# Patient Record
Sex: MALE | Race: WHITE | NOT HISPANIC OR LATINO | Employment: OTHER | URBAN - METROPOLITAN AREA
[De-identification: names, ages, dates, MRNs, and addresses within clinical notes are randomized per-mention and may not be internally consistent; named-entity substitution may affect disease eponyms.]

---

## 2017-02-28 ENCOUNTER — HOSPITAL ENCOUNTER (EMERGENCY)
Facility: HOSPITAL | Age: 52
Discharge: HOME | End: 2017-03-01
Attending: EMERGENCY MEDICINE | Admitting: EMERGENCY MEDICINE

## 2017-02-28 DIAGNOSIS — M54.50 ACUTE EXACERBATION OF CHRONIC LOW BACK PAIN: Primary | ICD-10-CM

## 2017-02-28 DIAGNOSIS — G89.29 ACUTE EXACERBATION OF CHRONIC LOW BACK PAIN: Primary | ICD-10-CM

## 2017-02-28 PROCEDURE — 99283 EMERGENCY DEPT VISIT LOW MDM: CPT

## 2017-02-28 RX ORDER — LIDOCAINE 50 MG/G
1 PATCH TOPICAL EVERY 24 HOURS
Qty: 6 PATCH | Refills: 0 | Status: SHIPPED | OUTPATIENT
Start: 2017-02-28 | End: 2017-04-17 | Stop reason: SDUPTHER

## 2017-02-28 RX ORDER — ARIPIPRAZOLE 5 MG/1
10 TABLET ORAL DAILY
COMMUNITY
End: 2017-04-17

## 2017-02-28 RX ORDER — METHOCARBAMOL 750 MG/1
1500 TABLET, FILM COATED ORAL 3 TIMES DAILY PRN
Qty: 30 TABLET | Refills: 0 | Status: SHIPPED | OUTPATIENT
Start: 2017-02-28 | End: 2017-04-17

## 2017-02-28 RX ORDER — DICLOFENAC SODIUM 75 MG/1
75 TABLET, DELAYED RELEASE ORAL 2 TIMES DAILY
Qty: 14 TABLET | Refills: 0 | Status: SHIPPED | OUTPATIENT
Start: 2017-02-28 | End: 2017-04-17

## 2017-02-28 RX ORDER — AMITRIPTYLINE HYDROCHLORIDE 100 MG/1
100 TABLET, FILM COATED ORAL NIGHTLY
COMMUNITY
End: 2019-01-03 | Stop reason: SDUPTHER

## 2017-02-28 RX ORDER — LISINOPRIL 20 MG/1
20 TABLET ORAL DAILY
COMMUNITY
End: 2017-04-17

## 2017-03-01 VITALS
WEIGHT: 190 LBS | TEMPERATURE: 98.1 F | HEIGHT: 67 IN | BODY MASS INDEX: 29.82 KG/M2 | DIASTOLIC BLOOD PRESSURE: 78 MMHG | HEART RATE: 98 BPM | OXYGEN SATURATION: 95 % | RESPIRATION RATE: 20 BRPM | SYSTOLIC BLOOD PRESSURE: 120 MMHG

## 2017-03-01 NOTE — ED NOTES
Pt states he worked 3 hours yesterday and he had to lay down on his side d/t pain in left lumbar area.     Georgia Quinonez RN  02/28/17 4568

## 2017-03-01 NOTE — DISCHARGE INSTRUCTIONS
Follow up with one of the Psychiatric physician groups below to setup primary care. If you have trouble following up, please call Cristina Agustin, our transitional care nurse, at [phone].    (Dr. Arciniega, Dr. Silva, Dr. Lizarraga, and Dr. Bhatti.)  [facility], Primary Care, [phone], [address], [place] [address]    [facility], Primary Care, [phone], [address], [address] [place], [address] [facility]) Psychiatric Medical [facility], Primary Care, [phone], [address], [address] [place], [address] [facility], Primary Care, [phone], [address], [address] [place], [address]     [place] [facility] Psychiatric Medical [facility], Primary Care, [phone], [address], [address] [place], [address]    [place] [facility], Primary Care, [phone], [address] [place]    [place], [address] [facility], Primary Care, [phone], [address] [place], [address] [facility], Primary Care, [phone], [address] [place], [address] [facility]) Psychiatric Medical [facility], Primary Care, [phone], [address] [place], [address] [facility] Medical [facility], Primary Care, [phone], [address], [address] [place], [address] [facility], Primary Care, [phone], [address], [place], [address]     [facility], Primary Care, [phone], [address] [id number]

## 2017-03-01 NOTE — ED PROVIDER NOTES
Subjective   HPI Comments: Jose Taylor is a 51 y.o.male who presents to the ED with c/o back pain. Patient has hx chronic back pain. He states that he is a recovering addict and was recently discharged from [facility]. Since then, he has had worsened back pain to the point where he was unable to work for part of the day secondary to pain in his left lumbar area. The pain improves with walking. He denies any numbness/tingling, incontinence, or any other acute sx at this time.    Patient is a 51 y.o. male presenting with back pain.   History provided by:  Patient  Back Pain   Location:  Lumbar spine  Radiates to:  Does not radiate  Pain severity:  Moderate  Pain is:  Same all the time  Onset quality:  Gradual  Duration: ongoing.  Timing:  Constant  Progression:  Worsening  Chronicity:  Chronic  Relieved by: walking.  Worsened by:  Nothing  Associated symptoms: no abdominal pain, no chest pain, no dysuria, no fever and no headaches        Review of Systems   Constitutional: Negative for chills, fatigue and fever.   HENT: Negative for congestion and sore throat.    Respiratory: Negative for shortness of breath.    Cardiovascular: Negative for chest pain.   Gastrointestinal: Negative for abdominal pain.   Genitourinary: Negative for dysuria.   Musculoskeletal: Positive for back pain.   Skin: Negative for rash.   Neurological: Negative for headaches.   Psychiatric/Behavioral: Negative for agitation and confusion.   All other systems reviewed and are negative.      Past Medical History   Diagnosis Date   • Delusional disorder    • Hormone disorder    • Hypertension    • Injury of back        No Known Allergies    Past Surgical History   Procedure Laterality Date   • Hair transplant     • Nose surgery         History reviewed. No pertinent family history.    Social History     Social History   • Marital status: Single     Spouse name: N/A   • Number of children: N/A   • Years of education: N/A     Social History Main  Topics   • Smoking status: Never Smoker   • Smokeless tobacco: None   • Alcohol use No   • Drug use: No   • Sexual activity: Defer     Other Topics Concern   • None     Social History Narrative   • None         Objective   Physical Exam   Constitutional: He is oriented to person, place, and time. He appears well-developed and well-nourished. No distress.   HENT:   Head: Normocephalic and atraumatic.   Eyes: Conjunctivae are normal. No scleral icterus.   Neck: Normal range of motion. Neck supple.   Cardiovascular: Normal rate, regular rhythm and normal heart sounds.    Pulmonary/Chest: Effort normal and breath sounds normal. No respiratory distress.   Abdominal: Soft. Bowel sounds are normal. There is no tenderness.   Musculoskeletal: Normal range of motion. He exhibits no edema.   Back nontender.  Patellar reflexes 2+ and equal.  Normal gait.   Neurological: He is alert and oriented to person, place, and time. He displays normal reflexes. He exhibits normal muscle tone.   Skin: Skin is warm and dry.   Psychiatric: He has a normal mood and affect. His behavior is normal.   Nursing note and vitals reviewed.      Procedures         ED Course  ED Course   Reviewed the MRI report pt brought with him showing extensive noncritical lower back disease.  He is neuro intact, normal strength and gait.  Discussed plan of care.  He really wants Lidoderm patches so I wrote for them but told him Medicaid likely will not cover them.                  MDM  Number of Diagnoses or Management Options  Acute exacerbation of chronic low back pain:      Amount and/or Complexity of Data Reviewed  Decide to obtain previous medical records or to obtain history from someone other than the patient: yes        Final diagnoses:   Acute exacerbation of chronic low back pain       Documentation assistance provided by alvarado Shabazz.  Information recorded by the alvarado was done at my direction and has been verified and validated by me.      Cristina Shabazz  02/28/17 2314       Cristina Shabazz  02/28/17 7369       Skyler Astorga MD  03/01/17 0113

## 2017-03-15 ENCOUNTER — HOSPITAL ENCOUNTER (EMERGENCY)
Facility: HOSPITAL | Age: 52
Discharge: HOME | End: 2017-03-15
Attending: EMERGENCY MEDICINE | Admitting: EMERGENCY MEDICINE

## 2017-03-15 VITALS
RESPIRATION RATE: 20 BRPM | TEMPERATURE: 97.5 F | OXYGEN SATURATION: 96 % | HEIGHT: 67 IN | WEIGHT: 190 LBS | SYSTOLIC BLOOD PRESSURE: 113 MMHG | HEART RATE: 120 BPM | BODY MASS INDEX: 29.82 KG/M2 | DIASTOLIC BLOOD PRESSURE: 99 MMHG

## 2017-03-15 DIAGNOSIS — T50.905A MEDICATION REACTION, INITIAL ENCOUNTER: Primary | ICD-10-CM

## 2017-03-15 PROCEDURE — 99283 EMERGENCY DEPT VISIT LOW MDM: CPT

## 2017-03-15 NOTE — ED PROVIDER NOTES
"Subjective   Patient is a 51 y.o. male presenting with general illness.   History provided by:  Patient  Illness   Quality:  Chest tissue swelling, \"my gynecomastia is coming back because of the Risperidone\"  Duration: Several days ago.  Progression:  Unchanged  Associated symptoms: no abdominal pain, no chest pain, no congestion, no cough, no diarrhea, no ear pain, no fever, no headaches, no nausea, no rash, no shortness of breath, no sore throat and no vomiting    Pt explains he has delusions and paranoia. He was recently discharged from [facility] (2 weeks ago) and had been put on Seroquel but he f/u with a Psychiatrist at  and took him off due to \"hand jesters\" and started him on Risperidone last week. He states he then was seen at  again 3 days ago and started on Abilify but he doesn't like it because he has trouble focusing on it. He took himself off the Risperidone.     Pt explains he has had x2 surgeries, one at [facility] \"liposuctioned by breast tissue\" for \"gynecomastia\". He explains after starting the Risperidone he felt tingling and burning in his chest wall and then he noticed he started to grow breasts. He came to ED today to \"have it in the records that the Risperidone caused my gynecomastia to come back\". He states \"I know they can't do anything about it now and I have to go see a plastic surgeon\". He denies having any other complaints while in the ED. Denies SI or HI. He plans to f/u with  but wanted this medication reaction in the system and that the people at [facility] wouldn't believe him.     Review of Systems   Constitutional: Negative for chills and fever.   HENT: Negative for congestion, ear pain, sore throat and trouble swallowing.    Eyes: Negative for pain, redness and visual disturbance.   Respiratory: Negative for cough and shortness of breath.    Cardiovascular: Negative for chest pain and leg swelling.   Gastrointestinal: Negative for abdominal pain, blood " "in stool, constipation, diarrhea, nausea and vomiting.   Genitourinary: Negative for difficulty urinating, dysuria and flank pain.   Musculoskeletal: Negative for arthralgias, back pain and joint swelling.   Skin: Negative.  Negative for rash and wound.        \"Gynecomastia\"   Allergic/Immunologic: Negative.    Neurological: Negative for dizziness, syncope, weakness, numbness and headaches.   Psychiatric/Behavioral: Negative for confusion.   All other systems reviewed and are negative.      Past Medical History   Diagnosis Date   • Delusional disorder    • Hormone disorder    • Hypertension    • Injury of back        Allergies   Allergen Reactions   • Risperidone And Related        Past Surgical History   Procedure Laterality Date   • Hair transplant     • Nose surgery         History reviewed. No pertinent family history.    Social History     Social History   • Marital status: Single     Spouse name: N/A   • Number of children: N/A   • Years of education: N/A     Social History Main Topics   • Smoking status: Never Smoker   • Smokeless tobacco: None   • Alcohol use No   • Drug use: No   • Sexual activity: Defer     Other Topics Concern   • None     Social History Narrative           Objective   Physical Exam   Constitutional: He is oriented to person, place, and time. He appears well-developed and well-nourished.   HENT:   Head: Atraumatic.   Nose: Nose normal.   Eyes: Conjunctivae, EOM and lids are normal. Pupils are equal, round, and reactive to light.   Neck: Normal range of motion. Neck supple.   Cardiovascular: Regular rhythm and normal heart sounds.  Tachycardia present.    Pulmonary/Chest: Effort normal and breath sounds normal. He exhibits no mass and no tenderness.   No obvious edema or erythema to soft tissue of chest wall.     Pt continues to grab at his chest stating, \"see all this, it's not skin, it's breast tissue\".    Abdominal: Soft. He exhibits no distension. There is no tenderness. There is no " "rebound and no guarding.   Musculoskeletal: Normal range of motion. He exhibits no edema, tenderness or deformity.   Neurological: He is alert and oriented to person, place, and time. He has normal strength. No sensory deficit.   Skin: Skin is warm, dry and intact.   Psychiatric: His behavior is normal. His mood appears anxious. His speech is rapid and/or pressured.   Nursing note and vitals reviewed.      Procedures         ED Course  ED Course   Explained need for patient to f/u with PCP and psych at . He is agreeable with this plan.      No results found for this or any previous visit (from the past 24 hour(s)).  Note: In addition to lab results from this visit, the labs listed above may include labs taken at another facility or during a different encounter within the last 24 hours. Please correlate lab times with ED admission and discharge times for further clarification of the services performed during this visit.    No orders to display     Vitals:    03/15/17 1524 03/15/17 1616 03/15/17 1618 03/15/17 1630   BP: 135/91 (!) 132/104  113/99   BP Location: Left arm      Patient Position: Sitting      Pulse: 120      Resp: 20      Temp: 97.5 °F (36.4 °C)      TempSrc: Oral      SpO2: 95%  95% 96%   Weight: 190 lb (86.2 kg)      Height: 67\" (170.2 cm)        Medications - No data to display                      MDM    Final diagnoses:   Medication reaction, initial encounter            JERRELL Duarte  03/15/17 7806    "

## 2017-04-17 ENCOUNTER — OFFICE VISIT (OUTPATIENT)
Dept: FAMILY MEDICINE CLINIC | Facility: CLINIC | Age: 52
End: 2017-04-17

## 2017-04-17 VITALS
OXYGEN SATURATION: 98 % | WEIGHT: 200.6 LBS | HEIGHT: 66 IN | TEMPERATURE: 97.6 F | SYSTOLIC BLOOD PRESSURE: 120 MMHG | DIASTOLIC BLOOD PRESSURE: 90 MMHG | HEART RATE: 75 BPM | BODY MASS INDEX: 32.24 KG/M2

## 2017-04-17 DIAGNOSIS — G89.29 CHRONIC BILATERAL LOW BACK PAIN WITHOUT SCIATICA: ICD-10-CM

## 2017-04-17 DIAGNOSIS — I10 ESSENTIAL HYPERTENSION: ICD-10-CM

## 2017-04-17 DIAGNOSIS — F22 DELUSIONAL DISORDER (HCC): Primary | ICD-10-CM

## 2017-04-17 DIAGNOSIS — M54.50 CHRONIC BILATERAL LOW BACK PAIN WITHOUT SCIATICA: ICD-10-CM

## 2017-04-17 PROCEDURE — 99203 OFFICE O/P NEW LOW 30 MIN: CPT | Performed by: NURSE PRACTITIONER

## 2017-04-17 RX ORDER — LISINOPRIL 10 MG/1
10 TABLET ORAL DAILY
Qty: 30 TABLET | Refills: 2 | Status: SHIPPED | OUTPATIENT
Start: 2017-04-17 | End: 2019-02-14 | Stop reason: SDUPTHER

## 2017-04-17 RX ORDER — LIDOCAINE 50 MG/G
1 PATCH TOPICAL EVERY 24 HOURS
Qty: 6 PATCH | Refills: 1 | Status: SHIPPED | OUTPATIENT
Start: 2017-04-17 | End: 2017-04-17 | Stop reason: SDUPTHER

## 2017-04-17 RX ORDER — LIDOCAINE 50 MG/G
1 PATCH TOPICAL EVERY 24 HOURS
Qty: 6 PATCH | Refills: 1 | Status: SHIPPED | OUTPATIENT
Start: 2017-04-17 | End: 2017-07-10

## 2017-04-17 NOTE — PROGRESS NOTES
"Subjective   Jose Taylor is a 51 y.o. male.     History of Present Illness   Mr. Taylor is here today to establish care, has not been taking psych medications because of gynecomastia, stopped seeing his psychiatrist, did not trust him. Patient was at [facility] feb 8-10, is having thoughts that others are \"persecuting\" him, denies thoughts of harm to self or others.He reports people are asking \"truck drivers to fight him\". Patient is willing to see a new psychiatrist.  Patient has not taken Lisinopril in 3 weeks, states his BP elevates during the day and does need a refill. Denies chest pain, shortness of breath, dizziness, or edema.  Patient has a complaint of chronic shoulder and back pain, would like a prescription for lidocaine patches that have worked well in the past so that he can mow and weed eat.  Patient has been told he needs to have 3 top right teeth pulled, wants to have those looked at also.  The following portions of the patient's history were reviewed and updated as appropriate: allergies, current medications, past family history, past medical history, past social history, past surgical history and problem list.    Review of Systems   Constitutional: Negative.    HENT: Positive for dental problem (has re). Negative for congestion, drooling, ear discharge, ear pain, facial swelling, mouth sores, rhinorrhea, sinus pressure, sore throat and trouble swallowing.    Eyes: Negative.    Respiratory: Negative.    Cardiovascular: Negative.    Gastrointestinal: Negative.    Endocrine: Negative.    Genitourinary: Negative.    Musculoskeletal: Positive for arthralgias and back pain. Negative for gait problem, joint swelling, myalgias, neck pain and neck stiffness.   Skin: Negative.    Allergic/Immunologic: Negative.    Neurological: Negative.    Hematological: Negative.    Psychiatric/Behavioral: Positive for agitation, behavioral problems and sleep disturbance. Negative for dysphoric mood, self-injury and " suicidal ideas. The patient is nervous/anxious.        Objective   Physical Exam   Constitutional: He is oriented to person, place, and time. He appears well-developed and well-nourished. No distress.   HENT:   Head: Normocephalic and atraumatic.   Right Ear: External ear normal.   Left Ear: External ear normal.   Mouth/Throat: Uvula is midline and oropharynx is clear and moist. Dental caries (receding gum line, no gum erythema or drainage noted) present. No oropharyngeal exudate.   Eyes: Conjunctivae and EOM are normal.   Neck: Normal range of motion. Neck supple. No thyromegaly present.   Cardiovascular: Normal rate, regular rhythm and normal heart sounds.    No murmur heard.  Pulmonary/Chest: Effort normal and breath sounds normal. No respiratory distress. He has no wheezes.   Abdominal: Soft.   Musculoskeletal: He exhibits no edema, tenderness or deformity.   Normal ROM of major joints   Neurological: He is alert and oriented to person, place, and time.   Skin: Skin is warm and dry.   Psychiatric:   Rambling speech, anxious, paranoid, is cooperative   Nursing note and vitals reviewed.      Assessment/Plan   Jose was seen today for establish care.    Diagnoses and all orders for this visit:    Delusional disorder  -     Ambulatory Referral to Psychiatry    Essential hypertension  -     lisinopril (PRINIVIL,ZESTRIL) 10 MG tablet; Take 1 tablet by mouth Daily.    Patient was scheduled with Dr. Song before leaving clinic today, Appointment is May 16.   Hypertension is controlled, refilled Lisinopril at a lower dose, advised patient to notify clinic of dizziness, headaches or other side effects.   Patient was encouraged to keep me informed of any acute changes, lack of improvement, or any new concerning symptoms.  Lidocaine patches ordered for shoulder/back pain.  There was no signs of current infection at [facility], patient will be referred to a dentist that takes his insurance for evaluation and treatment.  Patient  voiced understanding of all instructions and denied further questions.  Written by Jojo RICHARDSN, APRN student, acting as a scribe for JUAN Ross  This note accurately reflects work and decisions made by myself, Tanika SHANNNO

## 2017-07-05 ENCOUNTER — TELEPHONE (OUTPATIENT)
Dept: FAMILY MEDICINE CLINIC | Facility: CLINIC | Age: 52
End: 2017-07-05

## 2017-07-05 NOTE — TELEPHONE ENCOUNTER
Jose called requesting a refill on his Amitriptyline. His chart says 100 mg nightly, but he says he only takes 50 mg nightly. Is it ok to refill?

## 2017-07-07 ENCOUNTER — TELEPHONE (OUTPATIENT)
Dept: FAMILY MEDICINE CLINIC | Facility: CLINIC | Age: 52
End: 2017-07-07

## 2017-07-07 NOTE — TELEPHONE ENCOUNTER
PATIENT CALLED WANTING MEDICINE (ELAVIL) CALLED IN. AFTER SPEAKING WITH MEERA PATIENT NEEDS TO HAVE THE RX FILLED BY DR. ADEN. PATIENT WAS INFORMED BY VOICEMAIL.

## 2017-07-10 ENCOUNTER — OFFICE VISIT (OUTPATIENT)
Dept: FAMILY MEDICINE CLINIC | Facility: CLINIC | Age: 52
End: 2017-07-10

## 2017-07-10 VITALS
DIASTOLIC BLOOD PRESSURE: 88 MMHG | BODY MASS INDEX: 32.62 KG/M2 | HEIGHT: 66 IN | TEMPERATURE: 98.3 F | OXYGEN SATURATION: 98 % | SYSTOLIC BLOOD PRESSURE: 122 MMHG | WEIGHT: 203 LBS | HEART RATE: 98 BPM

## 2017-07-10 DIAGNOSIS — F22 DELUSIONAL DISORDER (HCC): ICD-10-CM

## 2017-07-10 DIAGNOSIS — R79.89 LOW TESTOSTERONE LEVEL IN MALE: Primary | ICD-10-CM

## 2017-07-10 PROCEDURE — 99213 OFFICE O/P EST LOW 20 MIN: CPT | Performed by: NURSE PRACTITIONER

## 2017-07-10 NOTE — PROGRESS NOTES
"Subjective   Jose Taylor is a 51 y.o. male.     History of Present Illness     Mr Taylor comes into the clinic today demanding refills on his amitriptyline at the .  I have declined to refill request last week due to the fact that I have never prescribed him this medication and this was not in his medication list the only other time I saw him before.  He apparently has a long history of mental illness and has been hospitalized at [facility].  He became very agitated at the window demanding a prescription for Elavil today.  When I saw him last I did refer him to psychiatry in [facility], appointment was May 16.  He apparently did not go to this appointment.  When I entered exam room he reports everybody is been telling lies on him and he has no history of any mental illness.  He tells me that there is a system of truck drivers who were out to get him and this was the fact that he is not \"crazy.  He tells me he has been taking amitriptyline off and on and has not taken this for about a month.  He also is requesting referral to urologist for evaluation of low testosterone.  Apparently he was on replacement several years ago and is unsure why this was stopped.  The following portions of the patient's history were reviewed and updated as appropriate: allergies, current medications, past family history, past medical history, past social history, past surgical history and problem list.    Review of Systems   Constitutional: Negative.    Respiratory: Negative.    Cardiovascular: Negative.        Objective   Physical Exam   Constitutional: He appears well-developed and well-nourished. No distress.   Cardiovascular: Normal rate and regular rhythm.    Pulmonary/Chest: Effort normal and breath sounds normal.   Skin: Skin is warm.   Psychiatric:   Apparently Mr. Taylor was somewhat agitated at the  after he was told he would not refill medication I have never given him in the past.  He demanded to " talk to me regarding this issue.  He has a history of delusional disorder once a reviewed his previous records.  When he entered the exam room I told him I'll be unable to start him on any psychiatric medications as I had turned over all of these prescriptions to a psychiatrist.  He again refuses psychiatric care.  He does not seem confused today for delusional and is coherent.   Vitals reviewed.      Assessment/Plan   Jose was seen today for med refill.    Diagnoses and all orders for this visit:    Low testosterone level in male  -     Ambulatory Referral to Urology    Delusional disorder      I have again offered to make an appointment with a psychiatrist however he declines this at this time.  He tells me he just wanted Elavil to help him sleep, I've advised him he could take some Tylenol PM for this if he would wish.  He does want referral to urology to evaluate him for low testosterone and I have made this referral.

## 2018-09-17 ENCOUNTER — OFFICE VISIT (OUTPATIENT)
Dept: PSYCHIATRY | Facility: CLINIC | Age: 53
End: 2018-09-17

## 2018-09-17 VITALS
BODY MASS INDEX: 38.57 KG/M2 | DIASTOLIC BLOOD PRESSURE: 82 MMHG | HEIGHT: 66 IN | WEIGHT: 240 LBS | SYSTOLIC BLOOD PRESSURE: 124 MMHG

## 2018-09-17 DIAGNOSIS — F20.0 SCHIZOPHRENIA, PARANOID TYPE (HCC): Primary | ICD-10-CM

## 2018-09-17 DIAGNOSIS — F41.1 GENERALIZED ANXIETY DISORDER: ICD-10-CM

## 2018-09-17 PROCEDURE — 90792 PSYCH DIAG EVAL W/MED SRVCS: CPT | Performed by: NURSE PRACTITIONER

## 2018-09-17 RX ORDER — OLANZAPINE 10 MG/1
10 TABLET ORAL 2 TIMES DAILY
COMMUNITY
End: 2018-12-03

## 2018-09-17 RX ORDER — LURASIDONE HYDROCHLORIDE 40 MG/1
40 TABLET, FILM COATED ORAL DAILY
Qty: 30 TABLET | Refills: 1 | Status: SHIPPED | OUTPATIENT
Start: 2018-09-17 | End: 2018-12-03 | Stop reason: SDUPTHER
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
## 2018-10-15 ENCOUNTER — OFFICE VISIT (OUTPATIENT)
Dept: PSYCHIATRY | Facility: CLINIC | Age: 53
End: 2018-10-15

## 2018-10-15 VITALS — WEIGHT: 240.8 LBS | HEIGHT: 66 IN | BODY MASS INDEX: 38.7 KG/M2

## 2018-10-15 DIAGNOSIS — F20.0 SCHIZOPHRENIA, PARANOID TYPE (HCC): Primary | ICD-10-CM

## 2018-10-15 DIAGNOSIS — F41.1 GENERALIZED ANXIETY DISORDER: ICD-10-CM

## 2018-10-15 PROCEDURE — 99213 OFFICE O/P EST LOW 20 MIN: CPT | Performed by: NURSE PRACTITIONER

## 2018-10-15 NOTE — PROGRESS NOTES
"  Subjective   Jose Taylor is a 53 y.o. male who is here today for medication management follow up.    Chief Complaint: schizophrenia paranoid type, BUTCH     History of Present Illness Patient presents by himself, he didn't start the Latuda because pharm told him he can't be on both Olanzapine and Latuda and that it costs 1200 a year. He reports no AVH, SI/HI. He is sleeping \"well enough\" on the amitriptyline, and cont the olanzapine. He reports paranoia low but feels jittery and tremulous.  .  Denies adverse effects from medications.   (Scales based on 0 - 10 with 10 being the worst)        The following portions of the patient's history were reviewed and updated as appropriate: allergies, current medications, past family history, past medical history, past social history, past surgical history and problem list.    Review of Systems denies fever, cough, s/s’s of infection, denies GI/ problems, denies new medical issues     Objective   Physical Exam  Height 167.6 cm (66\"), weight 109 kg (240 lb 12.8 oz). Body mass index is 38.87 kg/m².      Allergies   Allergen Reactions   • Invega [Paliperidone Er] Other (See Comments)     Gynecomastia    • Risperidone And Related Other (See Comments)     Gynecomastia        Current Medications:   Current Outpatient Prescriptions   Medication Sig Dispense Refill   • amitriptyline (ELAVIL) 100 MG tablet Take 100 mg by mouth Every Night.     • lisinopril (PRINIVIL,ZESTRIL) 10 MG tablet Take 1 tablet by mouth Daily. 30 tablet 2   • lurasidone (LATUDA) 40 MG tablet tablet Take 1 tablet by mouth Daily. 30 tablet 1   • OLANZapine (zyPREXA) 10 MG tablet Take 10 mg by mouth 2 (Two) Times a Day.       No current facility-administered medications for this visit.      Appearance: appropriate  Hygiene:   good  Cooperation:  Cooperative  Eye Contact:  Good  Psychomotor Behavior:  No psychomotor agitation/retardation, No EPS, No motor tics  Mood:  within normal limits  Affect:  " "Appropriate  Hopelessness: Denies  Speech:  Normal  Thought Process:  Linear  Thought Content:  Normal  Concentration: Normal   Suicidal:  None  Homicidal:  None  Hallucinations:  None  Delusion:  None  Memory:  Intact  Orientation:  Person, Place, Time and Situation  Reliability:  fair  Insight:  Fair  Judgement:  Fair  Impulse Control:  Fair  Estimated Intelligence: average range    Assessment/Plan   Diagnoses and all orders for this visit:    Schizophrenia, paranoid type (CMS/HCC)    Generalized anxiety disorder        IMPRESSION: pt had not started cross tapering of medications because of pharm communication, called [facility] [place], [place] and discussed with pharm and got straightened out  There will be no copay , has not gained more wt since Sept but still at 240 lbs  PLAN:   Begin Latuda 40mg PO one QD  Begin weaning of dropping olanzapine to 10mg daily after being on Latuda for 7 days  Cont amitriptyline for sleep   Discussed benadryl to take one at hs nightly for tremulous \"jittery\" feeling adverse effect from olanzapine probable     We discussed risks, benefits, and side effects of the above medications and the patient was agreeable with the plan. Patient was educated on the importance of compliance with treatment and follow-up appointments.     Sleep hygiene reviewed, encouraged more whole foods, less processed foods, fruits vegetables , more activity   Coping skills reviewed and encouraged positive framing of thoughts    Assisted patient in processing above session content; acknowledged and normalized patient’s thoughts, feelings, and concerns.  Applied  positive coping skills and behavior management in session.  Allowed patient to freely discuss issues without interruption or judgment. Provided safe, confidential environment to facilitate the development of positive therapeutic relationship and encourage open, honest communication. Assisted patient in identifying risk factors which would indicate the need " for higher level of care including thoughts to harm self or others and/or self-harming behavior and encouraged patient to contact this office, call 911, or present to the nearest emergency room should any of these events occur. Discussed crisis intervention services and means to access.  Patient adamantly and convincingly denies current suicidal or homicidal ideation or perceptual disturbance.    Treatment Plan: stabilize mood, patient will stay out of the hospital and be at optimal level of functioning, take all medication as prescribed. Patient verbalized  understanding and agreement to plan.    Instructed to call for questions or concerns and return early if necessary. Crisis plan reviewed including going to the Emergency department.    Return in about 4 weeks (around 11/12/2018).

## 2018-12-03 ENCOUNTER — OFFICE VISIT (OUTPATIENT)
Dept: PSYCHIATRY | Facility: CLINIC | Age: 53
End: 2018-12-03

## 2018-12-03 VITALS — BODY MASS INDEX: 38.89 KG/M2 | HEIGHT: 66 IN | WEIGHT: 242 LBS

## 2018-12-03 DIAGNOSIS — F20.0 SCHIZOPHRENIA, PARANOID TYPE (HCC): Primary | ICD-10-CM

## 2018-12-03 DIAGNOSIS — F41.1 GENERALIZED ANXIETY DISORDER: ICD-10-CM

## 2018-12-03 PROCEDURE — 99213 OFFICE O/P EST LOW 20 MIN: CPT | Performed by: NURSE PRACTITIONER

## 2018-12-03 RX ORDER — PROPRANOLOL HYDROCHLORIDE 10 MG/1
10 TABLET ORAL 2 TIMES DAILY
Qty: 60 TABLET | Refills: 1 | Status: SHIPPED | OUTPATIENT
Start: 2018-12-03 | End: 2018-12-03 | Stop reason: SDUPTHER

## 2018-12-03 RX ORDER — LURASIDONE HYDROCHLORIDE 80 MG/1
80 TABLET, FILM COATED ORAL DAILY
Qty: 30 TABLET | Refills: 0 | Status: SHIPPED | OUTPATIENT
Start: 2018-12-03 | End: 2018-12-03 | Stop reason: SDUPTHER

## 2018-12-03 RX ORDER — LURASIDONE HYDROCHLORIDE 80 MG/1
80 TABLET, FILM COATED ORAL DAILY
Qty: 30 TABLET | Refills: 0 | Status: SHIPPED | OUTPATIENT
Start: 2018-12-03 | End: 2019-01-03 | Stop reason: SDUPTHER

## 2018-12-03 RX ORDER — PROPRANOLOL HYDROCHLORIDE 10 MG/1
10 TABLET ORAL 2 TIMES DAILY
Qty: 60 TABLET | Refills: 1 | Status: SHIPPED | OUTPATIENT
Start: 2018-12-03 | End: 2019-01-03 | Stop reason: SDUPTHER

## 2018-12-03 NOTE — PROGRESS NOTES
"    Subjective   Jose Taylor is a 53 y.o. male who is here today for medication management follow up.    Chief Complaint: Diagnoses and all orders for this visit:    Schizophrenia, paranoid type (CMS/HCC)    Generalized anxiety disorder      History of Present Illness Patient presents by himself, he didn't come 4 weeks after last appt and stopped the olanzapine about 3 weeks ago and is only on Latuda 40 mg daily. He reports hearing things but no commands and feels more uncomfortable around people. Denies visual hallucinations and auditory is more chatter in head. He is sleeping. Cont with hand tremor bilaterally. Eating well, .  Denies adverse effects from medications. Denies SI/HI. Scores anxiety about a 4, denies depression. Going to the grocery store after this appt. Sister is his payee.  (Scales based on 0 - 10 with 10 being the worst)        The following portions of the patient's history were reviewed and updated as appropriate: allergies, current medications, past family history, past medical history, past social history, past surgical history and problem list.    Review of Systemsdenies fever, cough, s/s’s of infection, denies GI/ problems, denies new medical issues     Objective   Physical Exam  Height 167.6 cm (66\"), weight 110 kg (242 lb).    Allergies   Allergen Reactions   • Invega [Paliperidone Er] Other (See Comments)     Gynecomastia    • Risperidone And Related Other (See Comments)     Gynecomastia        Current Medications:   Current Outpatient Medications   Medication Sig Dispense Refill   • amitriptyline (ELAVIL) 100 MG tablet Take 100 mg by mouth Every Night.     • lisinopril (PRINIVIL,ZESTRIL) 10 MG tablet Take 1 tablet by mouth Daily. 30 tablet 2   • Lurasidone HCl 80 MG tablet Take 80 mg by mouth Daily. 30 tablet 0   • propranolol (INDERAL) 10 MG tablet Take 1 tablet by mouth 2 (Two) Times a Day. 60 tablet 1     No current facility-administered medications for this visit.      Appearance: " "appropriate  Hygiene:   good  Cooperation:  Cooperative  Eye Contact:  Good  Psychomotor Behavior:  No psychomotor agitation/retardation, No EPS, No motor tics  Mood:  within normal limits  Affect:  Appropriate  Hopelessness: Denies  Speech:  Normal  Thought Process:  Linear  Thought Content:  Normal  Concentration: Normal   Suicidal:  None  Homicidal:  None  Hallucinations:  \"chatter heard no command\"  Delusion:  None  Memory:  Intact  Orientation:  Person, Place, Time and Situation  Reliability:  fair  Insight:  Fair  Judgement:  Fair  Impulse Control:  Fair  Estimated Intelligence: average range        Assessment/Plan   Diagnoses and all orders for this visit:    Schizophrenia, paranoid type (CMS/HCC)    Generalized anxiety disorder    Other orders  -     Discontinue: Lurasidone HCl 80 MG tablet; Take 80 mg by mouth Daily.  -     Discontinue: propranolol (INDERAL) 10 MG tablet; Take 1 tablet by mouth 2 (Two) Times a Day.  -     propranolol (INDERAL) 10 MG tablet; Take 1 tablet by mouth 2 (Two) Times a Day.  -     Lurasidone HCl 80 MG tablet; Take 80 mg by mouth Daily.        IMPRESSION: off olanzapine having break through symptoms, AH, increase anxiety, generally feels safe.  PLAN:   Increase Latuda to 80mg daily and see back in 4 weeks, discussed to call me for any concerns  ADD propranolol 10mg PO BID for tremor     We discussed risks, benefits, and side effects of the above medications and the patient was agreeable with the plan. Patient was educated on the importance of compliance with treatment and follow-up appointments.     Sleep hygiene reviewed, encouraged more whole foods, less processed foods, fruits vegetables , more activity   Coping skills reviewed and encouraged positive framing of thoughts    Assisted patient in processing above session content; acknowledged and normalized patient’s thoughts, feelings, and concerns.  Applied  positive coping skills and behavior management in session.  Allowed " patient to freely discuss issues without interruption or judgment. Provided safe, confidential environment to facilitate the development of positive therapeutic relationship and encourage open, honest communication. Assisted patient in identifying risk factors which would indicate the need for higher level of care including thoughts to harm self or others and/or self-harming behavior and encouraged patient to contact this office, call 911, or present to the nearest emergency room should any of these events occur. Discussed crisis intervention services and means to access.  Patient adamantly and convincingly denies current suicidal or homicidal ideation or perceptual disturbance.    Treatment Plan: stabilize mood, patient will stay out of the hospital and be at optimal level of functioning, take all medication as prescribed. Patient verbalized  understanding and agreement to plan.    Instructed to call for questions or concerns and return early if necessary. Crisis plan reviewed including going to the Emergency department.    Return in about 4 weeks (around 12/31/2018).

## 2019-01-03 ENCOUNTER — OFFICE VISIT (OUTPATIENT)
Dept: PSYCHIATRY | Facility: CLINIC | Age: 54
End: 2019-01-03

## 2019-01-03 VITALS — HEIGHT: 66 IN | BODY MASS INDEX: 38.89 KG/M2 | WEIGHT: 242 LBS

## 2019-01-03 DIAGNOSIS — F51.05 INSOMNIA DUE TO MENTAL CONDITION: ICD-10-CM

## 2019-01-03 DIAGNOSIS — F41.1 GENERALIZED ANXIETY DISORDER: ICD-10-CM

## 2019-01-03 DIAGNOSIS — F20.0 SCHIZOPHRENIA, PARANOID TYPE (HCC): Primary | ICD-10-CM

## 2019-01-03 PROCEDURE — 99213 OFFICE O/P EST LOW 20 MIN: CPT | Performed by: NURSE PRACTITIONER

## 2019-01-03 RX ORDER — PROPRANOLOL HYDROCHLORIDE 10 MG/1
10 TABLET ORAL 2 TIMES DAILY
Qty: 60 TABLET | Refills: 3 | Status: SHIPPED | OUTPATIENT
Start: 2019-01-03 | End: 2019-07-09 | Stop reason: SDUPTHER

## 2019-01-03 RX ORDER — LURASIDONE HYDROCHLORIDE 80 MG/1
80 TABLET, FILM COATED ORAL DAILY
Qty: 30 TABLET | Refills: 3 | Status: SHIPPED | OUTPATIENT
Start: 2019-01-03 | End: 2019-02-14

## 2019-01-03 RX ORDER — AMITRIPTYLINE HYDROCHLORIDE 100 MG/1
100 TABLET, FILM COATED ORAL NIGHTLY
Qty: 30 TABLET | Refills: 5 | Status: SHIPPED | OUTPATIENT
Start: 2019-01-03 | End: 2019-06-13

## 2019-01-03 NOTE — PROGRESS NOTES
"    Subjective   Jose Taylor is a 53 y.o. male who is here today for medication management follow up.    Chief Complaint:     Schizophrenia, paranoid type (CMS/HCC)    Generalized anxiety disorder    Insomnia due to mental condition      History of Present Illness Patient presents by himself reporting he is doing well on the Latuda, \"it isn't as strong is it because I'm thinking clearly and not feeling sedated\". Discussed less side effects on medication. Denies AVH, denies commanding thoughts, denies worries, denies panic, denies SI/HI. Denies fears \"I think I\"m doing better, and less tremor I don't even notice it most of the time on that medicine\". Pt still has bilateral hand tremor, no gait problems noted. Sleeps about 5 hours a night \"I'm not as drowsy so not sleeping as well\". Denies depression. Doing own ADL's and grocery and pharm shopping takes a cab to get here. Denies adverse effects from medications.   (Scales based on 0 - 10 with 10 being the worst)        The following portions of the patient's history were reviewed and updated as appropriate: allergies, current medications, past family history, past medical history, past social history, past surgical history and problem list.    Review of Systemsdenies fever, cough, s/s’s of infection, denies GI/ problems, denies new medical issues     Objective   Physical Exam  Height 167.6 cm (66\"), weight 110 kg (242 lb).    Allergies   Allergen Reactions   • Invega [Paliperidone Er] Other (See Comments)     Gynecomastia    • Risperidone And Related Other (See Comments)     Gynecomastia        Current Medications:   Current Outpatient Medications   Medication Sig Dispense Refill   • amitriptyline (ELAVIL) 100 MG tablet Take 1 tablet by mouth Every Night. 30 tablet 5   • lisinopril (PRINIVIL,ZESTRIL) 10 MG tablet Take 1 tablet by mouth Daily. 30 tablet 2   • Lurasidone HCl 80 MG tablet Take 80 mg by mouth Daily. 30 tablet 3   • propranolol (INDERAL) 10 MG tablet " "Take 1 tablet by mouth 2 (Two) Times a Day. 60 tablet 3     No current facility-administered medications for this visit.        Appearance: appropriate  Hygiene:   good  Cooperation:  Cooperative  Eye Contact:  Good  Psychomotor Behavior:  No psychomotor agitation/retardation, No EPS, No motor tics  Mood:  within normal limits  Affect:  More animated  Hopelessness: Denies  Speech:  Normal  Thought Process:  Linear  Thought Content:  Normal  Concentration: Normal   Suicidal:  None  Homicidal:  None  Hallucinations:  None  Delusion:  None  Memory:  Intact  Orientation:  Person, Place, Time and Situation  Reliability:  fair  Insight:  Fair  Judgement:  Fair  Impulse Control:  Fair  Estimated Intelligence: average range    Assessment/Plan   Diagnoses and all orders for this visit:    Schizophrenia, paranoid type (CMS/HCC)    Generalized anxiety disorder    Insomnia due to mental condition    Other orders  -     amitriptyline (ELAVIL) 100 MG tablet; Take 1 tablet by mouth Every Night.  -     propranolol (INDERAL) 10 MG tablet; Take 1 tablet by mouth 2 (Two) Times a Day.  -     Lurasidone HCl 80 MG tablet; Take 80 mg by mouth Daily.        IMPRESSION: improvement in cognition, decreased tremor denies AVH, some paranoia \"can't trust people\"  PLAN:   Cont on current med management as stated above  Increased amitriptyline for sleep to 100mg at hs    We discussed risks, benefits, and side effects of the above medications and the patient was agreeable with the plan. Patient was educated on the importance of compliance with treatment and follow-up appointments.     Counseled patient regarding multimodal approach with healthy nutrition, healthy sleep, regular physical activity, social activities, counseling, and medications.  Avoid alcohol and stimulants ie caffeine close to bedtime.      Coping skills reviewed and encouraged positive framing of thoughts    Assisted patient in processing above session content; acknowledged and " normalized patient’s thoughts, feelings, and concerns.  Applied  positive coping skills and behavior management in session.  Allowed patient to freely discuss issues without interruption or judgment. Provided safe, confidential environment to facilitate the development of positive therapeutic relationship and encourage open, honest communication. Assisted patient in identifying risk factors which would indicate the need for higher level of care including thoughts to harm self or others and/or self-harming behavior and encouraged patient to contact this office, call 911, or present to the nearest emergency room should any of these events occur. Discussed crisis intervention services and means to access.  Patient adamantly and convincingly denies current suicidal or homicidal ideation or perceptual disturbance.    Treatment Plan: stabilize mood, patient will stay out of the hospital and be at optimal level of functioning, take all medication as prescribed. Patient verbalized  understanding and agreement to plan.    Instructed to call for questions or concerns and return early if necessary. Crisis plan reviewed including going to the Emergency department.    Return in about 6 weeks (around 2/14/2019).

## 2019-02-14 ENCOUNTER — OFFICE VISIT (OUTPATIENT)
Dept: PSYCHIATRY | Facility: CLINIC | Age: 54
End: 2019-02-14

## 2019-02-14 VITALS
DIASTOLIC BLOOD PRESSURE: 84 MMHG | BODY MASS INDEX: 38.09 KG/M2 | SYSTOLIC BLOOD PRESSURE: 160 MMHG | WEIGHT: 237 LBS | HEIGHT: 66 IN

## 2019-02-14 DIAGNOSIS — F20.0 SCHIZOPHRENIA, PARANOID TYPE (HCC): Primary | ICD-10-CM

## 2019-02-14 DIAGNOSIS — F51.05 INSOMNIA DUE TO MENTAL CONDITION: ICD-10-CM

## 2019-02-14 DIAGNOSIS — F41.1 GENERALIZED ANXIETY DISORDER: ICD-10-CM

## 2019-02-14 DIAGNOSIS — I10 ESSENTIAL HYPERTENSION: ICD-10-CM

## 2019-02-14 PROCEDURE — 99213 OFFICE O/P EST LOW 20 MIN: CPT | Performed by: NURSE PRACTITIONER

## 2019-02-14 RX ORDER — LURASIDONE HYDROCHLORIDE 120 MG/1
120 TABLET, FILM COATED ORAL DAILY
Qty: 30 TABLET | Refills: 2 | Status: SHIPPED | OUTPATIENT
Start: 2019-02-14 | End: 2019-06-13

## 2019-02-14 RX ORDER — LISINOPRIL 20 MG/1
20 TABLET ORAL DAILY
Qty: 30 TABLET | Refills: 2 | Status: ON HOLD | OUTPATIENT
Start: 2019-02-14 | End: 2020-03-03 | Stop reason: SDUPTHER

## 2019-02-14 NOTE — PROGRESS NOTES
"    Subjective   Jose Taylor is a 53 y.o. male who is here today for medication management follow up.    Chief Complaint: Diagnoses and all orders for this visit:    Schizophrenia, paranoid type (CMS/HCC)    Generalized anxiety disorder    Insomnia due to mental condition        History of Present Illness Patient presents stating he likes the medication because he isn't gaining weight but still has paranoid thoughts,He worries people are trying to or can read his mind when he is out or at home  denies AH commands, denies AVH. He is sleeping, caring for himself and sister calls and checks on him. He reports he shakes when he is nervous but denies shaking when home alone.  Denies adverse effects from medications.   (Scales based on 0 - 10 with 10 being the worst)        The following portions of the patient's history were reviewed and updated as appropriate: allergies, current medications, past family history, past medical history, past social history, past surgical history and problem list.    Review of Systemsdenies fever, cough, s/s’s of infection, denies GI/ problems, denies new medical issues     Objective   Physical Exam  Blood pressure 160/84, height 167.6 cm (66\"), weight 108 kg (237 lb).    Allergies   Allergen Reactions   • Invega [Paliperidone Er] Other (See Comments)     Gynecomastia    • Risperidone And Related Other (See Comments)     Gynecomastia        Current Medications:   Current Outpatient Medications   Medication Sig Dispense Refill   • amitriptyline (ELAVIL) 100 MG tablet Take 1 tablet by mouth Every Night. 30 tablet 5   • lisinopril (PRINIVIL,ZESTRIL) 20 MG tablet Take 1 tablet by mouth Daily. 30 tablet 2   • Lurasidone HCl 120 MG tablet Take 120 mg by mouth Daily. 30 tablet 2   • propranolol (INDERAL) 10 MG tablet Take 1 tablet by mouth 2 (Two) Times a Day. 60 tablet 3     No current facility-administered medications for this visit.        Appearance: appropriate  Hygiene:   " good  Cooperation:  Cooperative  Eye Contact:  Fair   Psychomotor Behavior:  Tremor hands, shakes leg up and down, when asked to stop he can, no tongue movement or involuntary mouth movement  Mood:  anxious  Affect: restricted   Hopelessness: Denies  Speech:  Normal  Thought Process:  Goal directed   Thought Content:  Normal  Concentration: Normal   Suicidal:  None  Homicidal:  None  Hallucinations:  None  Delusion:  He worries people are trying to or can read his mind when he is out or at home   Memory:  Intact  Orientation:  Person, Place, Time and Situation  Reliability:  fair  Insight:  Fair  Judgement:  Fair  Impulse Control:  Fair  Estimated Intelligence: average range      Assessment/Plan   Diagnoses and all orders for this visit:    Schizophrenia, paranoid type (CMS/HCC)    Generalized anxiety disorder    Insomnia due to mental condition    Essential hypertension  -     lisinopril (PRINIVIL,ZESTRIL) 20 MG tablet; Take 1 tablet by mouth Daily.    Other orders  -     Lurasidone HCl 120 MG tablet; Take 120 mg by mouth Daily.        IMPRESSION: residual symptoms paranoid thoughts   PLAN:   Increase Latuda to 120mg daily    I refilled his lisinopril because he hasn't gone to his PCP, encouraged him to do so for f/u as it has been six months he states    We discussed risks, benefits, and side effects of the above medications and the patient was agreeable with the plan. Patient was educated on the importance of compliance with treatment and follow-up appointments.     Counseled patient regarding multimodal approach with healthy nutrition, healthy sleep, regular physical activity, social activities, counseling, and medications. E Avoid alcohol and stimulants ie caffeine       Treatment Plan: stabilize mood, patient will stay out of the hospital and be at optimal level of functioning, take all medication as prescribed. Patient verbalized  understanding and agreement to plan.    Instructed to call for questions or  concerns and return early if necessary. Crisis plan reviewed including going to the Emergency department.    Return in about 4 weeks (around 3/14/2019).

## 2019-03-04 ENCOUNTER — TELEPHONE (OUTPATIENT)
Dept: PSYCHIATRY | Facility: CLINIC | Age: 54
End: 2019-03-04

## 2019-03-04 NOTE — TELEPHONE ENCOUNTER
I called the rep and she is checking on a couple of things, such as a voucher for 28 days for med, or samples. I will let you know when I hear back from her. thanks

## 2019-03-04 NOTE — TELEPHONE ENCOUNTER
That is great! Thank you, and will give us time to evaluate all of that and more info. Will you please call in the Latuda 120mg daily x 14 days with 1 refill thanks

## 2019-03-04 NOTE — TELEPHONE ENCOUNTER
I have a voucher for 28 days of Latuda at no charge for the patient. We will need to send in a prescription for 14 days of medication, with a refill. I will call patient and have him come by and get the voucher, if this is ok with you? He still is not sure about going forward if he doesn't get a secondary insurance, but this will help until then.

## 2019-03-04 NOTE — TELEPHONE ENCOUNTER
Will you please call the Latuda  and see if we can get samples to cover him for one month please? He is on Latuda 120mg daily, if not let me know and I call something in today, power

## 2019-03-14 ENCOUNTER — OFFICE VISIT (OUTPATIENT)
Dept: PSYCHIATRY | Facility: CLINIC | Age: 54
End: 2019-03-14

## 2019-03-14 VITALS — BODY MASS INDEX: 37.45 KG/M2 | HEIGHT: 66 IN | WEIGHT: 233 LBS

## 2019-03-14 DIAGNOSIS — F41.1 GENERALIZED ANXIETY DISORDER: ICD-10-CM

## 2019-03-14 DIAGNOSIS — F20.0 SCHIZOPHRENIA, PARANOID TYPE (HCC): Primary | ICD-10-CM

## 2019-03-14 DIAGNOSIS — F51.05 INSOMNIA DUE TO MENTAL CONDITION: ICD-10-CM

## 2019-03-14 PROCEDURE — 99213 OFFICE O/P EST LOW 20 MIN: CPT | Performed by: NURSE PRACTITIONER

## 2019-03-14 NOTE — PROGRESS NOTES
Subjective   Jose Taylor is a 53 y.o. male who is here today for medication management follow up.    Chief Complaint:     Schizophrenia, paranoid type (CMS/HCC)    Generalized anxiety disorder    Insomnia due to mental condition        History of Present Illness Patient presents by himself for f/u. He reports less paranoia but still has concerns that people will watch him and stare at him.  He reports feeling safe at his motel room and we discussed possibility of getting an apartment.  His sister is his payee and he states he has to get his birth certificate copy so he can get a ID so he can get an apartment.  Discussed wraparound services for him and gave him a list of resources in town.  Patient reports maintaining compliance with medications and does feel better, he is more engaged in discussion and cognitively sharper.  He reports sleeping at night without difficulty.  He states he feels calmer on the higher dose of Latuda.  He still has bilateral tremors in his hands did not start the Inderal yet because his prescription medical card had lapsed.  He is getting that reinstated first of the month and promises that he has all his medications and has not lapsed on the Latuda.  He states he only has 5 more pills so I have given him a 30-day free voucher to get a new supply.  He has steady gait, he is able to stop his hands from tremoring they are tremulous at rest he has no involuntary movements of his face tongue or bar body other than his hands.  .  Denies adverse effects from medications.  Denies alcohol or drug intake denies smoking tobacco  (Scales based on 0 - 10 with 10 being the worst)        The following portions of the patient's history were reviewed and updated as appropriate: allergies, current medications, past family history, past medical history, past social history, past surgical history and problem list.    Review of Systemsdenies fever, cough, s/s’s of infection, denies GI/ problems, denies  "new medical issues     Objective   Physical Exam  Height 167.6 cm (66\"), weight 106 kg (233 lb).    Allergies   Allergen Reactions   • Invega [Paliperidone Er] Other (See Comments)     Gynecomastia    • Risperidone And Related Other (See Comments)     Gynecomastia        Current Medications:   Current Outpatient Medications   Medication Sig Dispense Refill   • amitriptyline (ELAVIL) 100 MG tablet Take 1 tablet by mouth Every Night. 30 tablet 5   • lisinopril (PRINIVIL,ZESTRIL) 20 MG tablet Take 1 tablet by mouth Daily. 30 tablet 2   • Lurasidone HCl 120 MG tablet Take 120 mg by mouth Daily. 30 tablet 2   • propranolol (INDERAL) 10 MG tablet Take 1 tablet by mouth 2 (Two) Times a Day. 60 tablet 3     No current facility-administered medications for this visit.        Appearance: appropriate  Hygiene:   good  Cooperation:  Cooperative  Eye Contact:  Good  Psychomotor Behavior:  No psychomotor agitation/retardation, No EPS, No motor tics  Mood:  within normal limits  Affect:  Appropriate  Hopelessness: Denies  Speech:  Normal  Thought Process:  Linear  Thought Content:  Normal  Concentration: Normal   Suicidal:  None  Homicidal:  None  Hallucinations:  None  Delusion: Has persistent paranoia that others are judging and watching him  Memory:  Intact  Orientation:  Person, Place, Time and Situation  Reliability:  fair  Insight:  Fair  Judgement:  Fair  Impulse Control:  Fair  Estimated Intelligence: average range      Assessment/Plan   Diagnoses and all orders for this visit:    Schizophrenia, paranoid type (CMS/HCC)    Generalized anxiety disorder    Insomnia due to mental condition        IMPRESSION: Improvement on Latuda, but still has tremor bilaterally and hands has not begun the propranolol  PLAN:   Continue current medications without changes  Latuda 120 mg p.o. q. Evening  Amitriptyline 100 mg nightly for sleep  Propranolol 10 mg twice daily for hand tremors    Gave patient resources on a piece of paper so he can " make phone calls for wraparound services which she states he well and was very interested.  He reports minimal support from his sister but she does bring him money every month to pay for his motel room and food and other needs.  Patient has a check that is sent to his sister's house.  She will continue to be his payee.  He reports she has been very helpful over the years has given him money when he needed up.  Patient was a nurse at [employer] for years.  He has tried to work over the years but it has been very difficult with his diagnosis.    We discussed risks, benefits, and side effects of the above medications and the patient was agreeable with the plan. Patient was educated on the importance of compliance with treatment and follow-up appointments.     Counseled patient regarding multimodal approach with healthy nutrition, healthy sleep, regular physical activity, social activities, counseling, and medications.  Denies alcohol or illicit drug intake      Coping skills reviewed and encouraged positive framing of thoughts    Assisted patient in processing above session content; acknowledged and normalized patient’s thoughts, feelings, and concerns.  Applied  positive coping skills and behavior management in session.  Allowed patient to freely discuss issues without interruption or judgment. Provided safe, confidential environment to facilitate the development of positive therapeutic relationship and encourage open, honest communication. Assisted patient in identifying risk factors which would indicate the need for higher level of care including thoughts to harm self or others and/or self-harming behavior and encouraged patient to contact this office, call 911, or present to the nearest emergency room should any of these events occur. Discussed crisis intervention services and means to access.  Patient adamantly and convincingly denies current suicidal or homicidal ideation or perceptual  disturbance.    Treatment Plan: stabilize mood, patient will stay out of the hospital and be at optimal level of functioning, take all medication as prescribed. Patient verbalized  understanding and agreement to plan.    Instructed to call for questions or concerns and return early if necessary. Crisis plan reviewed including going to the Emergency department.    Return in about 3 months (around 6/14/2019).

## 2019-06-13 ENCOUNTER — OFFICE VISIT (OUTPATIENT)
Dept: PSYCHIATRY | Facility: CLINIC | Age: 54
End: 2019-06-13

## 2019-06-13 VITALS — WEIGHT: 221 LBS | BODY MASS INDEX: 35.52 KG/M2 | HEIGHT: 66 IN

## 2019-06-13 DIAGNOSIS — F51.05 INSOMNIA DUE TO MENTAL CONDITION: ICD-10-CM

## 2019-06-13 DIAGNOSIS — F20.0 SCHIZOPHRENIA, PARANOID TYPE (HCC): Primary | ICD-10-CM

## 2019-06-13 DIAGNOSIS — F22 DELUSIONAL DISORDER (HCC): ICD-10-CM

## 2019-06-13 DIAGNOSIS — F41.1 GENERALIZED ANXIETY DISORDER: ICD-10-CM

## 2019-06-13 PROCEDURE — 99214 OFFICE O/P EST MOD 30 MIN: CPT | Performed by: NURSE PRACTITIONER

## 2019-06-13 RX ORDER — LURASIDONE HYDROCHLORIDE 80 MG/1
80 TABLET, FILM COATED ORAL NIGHTLY
COMMUNITY
End: 2019-07-09 | Stop reason: SDUPTHER

## 2019-06-13 RX ORDER — AMITRIPTYLINE HYDROCHLORIDE 150 MG/1
150 TABLET, FILM COATED ORAL NIGHTLY
Qty: 30 TABLET | Refills: 0 | Status: SHIPPED | OUTPATIENT
Start: 2019-06-13 | End: 2019-07-09 | Stop reason: SDUPTHER

## 2019-06-13 NOTE — PROGRESS NOTES
Jose Taylor is a 53 y.o. male is here today for medication management follow-up.    Chief Complaint:      ICD-10-CM ICD-9-CM   1. Schizophrenia, paranoid type (CMS/[facility]) F20.0 295.30   2. Generalized anxiety disorder F41.1 300.02   3. Delusional disorder (CMS/[facility]) F22 297.1   4. Insomnia due to mental condition F51.05 300.9     327.02       History of Present Illness:  Pt presents for follow up visit and medication management of mood symptoms. Pt is currently taking Latuda 120 mg with dinner and states he feels more restless on the higher dose and has had more difficluty falling asleep. Continues to have paranoid delusions; states he feels as if people are watching and listening in on him. States he has only been taking Propranolol in the evenings to help with his tremors.      Pt reports the presence/absence of the following anxiety symptoms: (+) excessive worry, (+) excessive fear, (+) difficulty relaxing, (+) restlessness, (+) insomnia, (-) easily fatigued, (-) irritability, (-) poor concentration, (+) racing thoughts, and (-) panic episodes.     Pt reports the presence/absence of the following depressive symptoms: (-) depressed mood, (-) reduced appetite, (-) increased appetite, (-) poor concentration, (-) hopelessness, (-) worthlessness, (-) insomnia, (-) hypersomnia, (-) psychomotor agitation, (-) irritability, (-) anhedonia, (-) amotivation, (-) fatigue, (-) suicidal ideation, (-) suicidal plan, (-) suicidal intent, (-) recurrent thoughts about death, and (-) homicidal ideation.    The following portions of the patient's history were reviewed and updated as appropriate: allergies, current medications, past family history, past medical history, past social history, past surgical history and problem list.    Review of Systems;;  Review of Systems   Constitutional: Positive for fatigue. Negative for activity change, appetite change, unexpected weight gain and unexpected weight loss.   HENT: Negative.   "  Respiratory: Negative.    Cardiovascular: Negative.  Negative for chest pain.   Gastrointestinal: Negative.  Negative for diarrhea, nausea and vomiting.   Musculoskeletal: Negative.    Skin: Negative for rash and bruise.   Neurological: Positive for tremors. Negative for dizziness, seizures and speech difficulty.   Psychiatric/Behavioral: Positive for sleep disturbance. Negative for agitation, behavioral problems, decreased concentration, hallucinations, self-injury, suicidal ideas, negative for hyperactivity, depressed mood and stress. The patient is nervous/anxious.        Physical Exam;;  Physical Exam  Height 167.6 cm (66\"), weight 100 kg (221 lb).    Current Medications;;    Current Outpatient Medications:   •  lisinopril (PRINIVIL,ZESTRIL) 20 MG tablet, Take 1 tablet by mouth Daily., Disp: 30 tablet, Rfl: 2  •  Lurasidone HCl 80 MG tablet, Take 80 mg by mouth Every Night., Disp: , Rfl:   •  propranolol (INDERAL) 10 MG tablet, Take 1 tablet by mouth 2 (Two) Times a Day., Disp: 60 tablet, Rfl: 3  •  amitriptyline (ELAVIL) 150 MG tablet, Take 1 tablet by mouth Every Night., Disp: 30 tablet, Rfl: 0    Lab Results:   No visits with results within 3 Month(s) from this visit.   Latest known visit with results is:   No results found for any previous visit.       Mental Status Exam:   Hygiene:   fair  Cooperation:  Cooperative  Eye Contact:  Good  Psychomotor Behavior:  Appropriate  Mood:euthymic  Affect:  Blunted  Hopelessness: Denies  Speech:  Normal  Thought Process:  Goal directed  Thought Content:  Normal  Suicidal:  None  Homicidal:  None  Hallucinations:  None  Delusion:  Paranoid  Memory:  Intact  Orientation:  Person, Place, Time and Situation  Reliability:  good  Insight:  Fair  Judgement:  Fair  Impulse Control:  Good  Physical/Medical Issues:  No         Assessment Plan;;  Diagnoses and all orders for this visit:    Schizophrenia, paranoid type (CMS/HCC)    Generalized anxiety disorder    Delusional " disorder (CMS/HCC)    Insomnia due to mental condition  -     amitriptyline (ELAVIL) 150 MG tablet; Take 1 tablet by mouth Every Night.    Decrease Latuda to 80 mg    Increase Amitriptyline      A psychological evaluation was conducted in order to assess past and current level of functioning. Areas assessed included, but were not limited to: perception of social support, perception of ability to face and deal with challenges in life (positive functioning), anxiety symptoms, depressive symptoms, perspective on beliefs/belief system, coping skills for stress, intelligence level,  Therapeutic rapport was established. Interventions conducted today were geared towards incorporating medication management along with support for continued therapy. Education was also provided as to the med management with this provider and what to expect in subsequent sessions.    We discussed risks, benefits,goals and side effects of the above medication and the patient was agreeable with the plan.Patient was educated on the importance of compliance with treatment and follow-up appointments. Patient is aware to contact the [facility] with any worsening of symptoms. To call for questions or concerns and return early if necessary. Patent is agreeable to go to the Emergency Department or call 911 should they begin SI/HI.     Treatment Plan: stabilize mood,  patient will stay out of the hospital and be at optimal level of functioning, take all medication as prescribed. Patient verbalized  understanding and agreement to plan.    Return in about 4 weeks (around 7/11/2019) for Follow Up.    Carmen Henao, DNP, APRN, PMHNP-BC, FNP-C

## 2019-07-09 DIAGNOSIS — F51.05 INSOMNIA DUE TO MENTAL CONDITION: ICD-10-CM

## 2019-07-09 RX ORDER — LURASIDONE HYDROCHLORIDE 80 MG/1
80 TABLET, FILM COATED ORAL NIGHTLY
Qty: 30 TABLET | Refills: 2 | Status: SHIPPED | OUTPATIENT
Start: 2019-07-09 | End: 2019-10-04 | Stop reason: SDUPTHER

## 2019-07-09 RX ORDER — AMITRIPTYLINE HYDROCHLORIDE 150 MG/1
150 TABLET, FILM COATED ORAL NIGHTLY
Qty: 90 TABLET | Refills: 0 | Status: SHIPPED | OUTPATIENT
Start: 2019-07-09 | End: 2019-10-04 | Stop reason: SDUPTHER

## 2019-07-09 RX ORDER — PROPRANOLOL HYDROCHLORIDE 10 MG/1
10 TABLET ORAL 2 TIMES DAILY
Qty: 60 TABLET | Refills: 3 | Status: SHIPPED | OUTPATIENT
Start: 2019-07-09 | End: 2019-10-30

## 2019-07-09 RX ORDER — AMITRIPTYLINE HYDROCHLORIDE 150 MG/1
150 TABLET, FILM COATED ORAL NIGHTLY
Qty: 30 TABLET | Refills: 0 | Status: CANCELLED | OUTPATIENT
Start: 2019-07-09

## 2019-07-31 ENCOUNTER — OFFICE VISIT (OUTPATIENT)
Dept: PSYCHIATRY | Facility: CLINIC | Age: 54
End: 2019-07-31

## 2019-07-31 VITALS — WEIGHT: 220 LBS | HEIGHT: 66 IN | BODY MASS INDEX: 35.36 KG/M2

## 2019-07-31 DIAGNOSIS — F41.1 GENERALIZED ANXIETY DISORDER: ICD-10-CM

## 2019-07-31 DIAGNOSIS — F20.0 SCHIZOPHRENIA, PARANOID TYPE (HCC): Primary | ICD-10-CM

## 2019-07-31 DIAGNOSIS — F51.05 INSOMNIA DUE TO MENTAL CONDITION: ICD-10-CM

## 2019-07-31 PROCEDURE — 99213 OFFICE O/P EST LOW 20 MIN: CPT | Performed by: NURSE PRACTITIONER

## 2019-07-31 NOTE — PROGRESS NOTES
Jose Taylor is a 54 y.o. male is here today for medication management follow-up.    Chief Complaint:      ICD-10-CM ICD-9-CM   1. Schizophrenia, paranoid type (CMS/[facility]) F20.0 295.30   2. Generalized anxiety disorder F41.1 300.02   3. Insomnia due to mental condition F51.05 300.9     327.02       History of Present Illness:  Pt presents for follow up visit and medication management of mood symptoms. Pt is currently taking Latuda 80 mg with dinner and states he feels less restlessness on the lower dose. States he feels restlessness on occasion not daily. Reports improvement in sleep. Continues to have paranoid delusions; states he feels as if people are watching and listening in on him. States he has only been taking Propranolol in the evenings to help with his tremors.      Pt reports the presence/absence of the following anxiety symptoms: (-) excessive worry, (+) excessive fear, (+) difficulty relaxing, (+) restlessness, (-) insomnia, (-) easily fatigued, (-) irritability, (-) poor concentration, (+) racing thoughts, and (-) panic episodes.       The following portions of the patient's history were reviewed and updated as appropriate: allergies, current medications, past family history, past medical history, past social history, past surgical history and problem list.    Review of Systems;;  Review of Systems   Constitutional: Negative.  Negative for activity change, appetite change, fatigue, unexpected weight gain and unexpected weight loss.   HENT: Negative.    Respiratory: Negative.    Cardiovascular: Negative.  Negative for chest pain.   Gastrointestinal: Negative.  Negative for diarrhea, nausea and vomiting.   Musculoskeletal: Negative.    Skin: Negative for rash and bruise.   Neurological: Negative for dizziness, tremors, seizures and speech difficulty.   Psychiatric/Behavioral: Negative for agitation, behavioral problems, decreased concentration, hallucinations, self-injury, sleep disturbance, suicidal  "ideas, negative for hyperactivity, depressed mood and stress. The patient is nervous/anxious.        Physical Exam;;  Physical Exam  Height 167.6 cm (66\"), weight 99.8 kg (220 lb).    Current Medications;;    Current Outpatient Medications:   •  amitriptyline (ELAVIL) 150 MG tablet, Take 1 tablet by mouth Every Night., Disp: 90 tablet, Rfl: 0  •  lisinopril (PRINIVIL,ZESTRIL) 20 MG tablet, Take 1 tablet by mouth Daily., Disp: 30 tablet, Rfl: 2  •  Lurasidone HCl 80 MG tablet, Take 80 mg by mouth Every Night., Disp: 30 tablet, Rfl: 2  •  propranolol (INDERAL) 10 MG tablet, Take 1 tablet by mouth 2 (Two) Times a Day., Disp: 60 tablet, Rfl: 3    Lab Results:   No visits with results within 3 Month(s) from this visit.   Latest known visit with results is:   No results found for any previous visit.       Mental Status Exam:   Hygiene:   fair  Cooperation:  Cooperative  Eye Contact:  Good  Psychomotor Behavior:  Appropriate  Mood:euthymic  Affect:  Blunted  Hopelessness: Denies  Speech:  Normal  Thought Process:  Goal directed  Thought Content:  Normal  Suicidal:  None  Homicidal:  None  Hallucinations:  None  Delusion:  Paranoid  Memory:  Intact  Orientation:  Person, Place, Time and Situation  Reliability:  good  Insight:  Fair  Judgement:  Fair  Impulse Control:  Good  Physical/Medical Issues:  No         Assessment Plan;;  Diagnoses and all orders for this visit:    Schizophrenia, paranoid type (CMS/HCC)    Generalized anxiety disorder    Insomnia due to mental condition    Continue present medications    A psychological evaluation was conducted in order to assess past and current level of functioning. Areas assessed included, but were not limited to: perception of social support, perception of ability to face and deal with challenges in life (positive functioning), anxiety symptoms, depressive symptoms, perspective on beliefs/belief system, coping skills for stress, intelligence level,  Therapeutic rapport was " established. Interventions conducted today were geared towards incorporating medication management along with support for continued therapy. Education was also provided as to the med management with this provider and what to expect in subsequent sessions.    We discussed risks, benefits,goals and side effects of the above medication and the patient was agreeable with the plan.Patient was educated on the importance of compliance with treatment and follow-up appointments. Patient is aware to contact the [facility] with any worsening of symptoms. To call for questions or concerns and return early if necessary. Patent is agreeable to go to the Emergency Department or call 911 should they begin SI/HI.     Treatment Plan: stabilize mood,  patient will stay out of the hospital and be at optimal level of functioning, take all medication as prescribed. Patient verbalized  understanding and agreement to plan.    Return in about 3 months (around 10/31/2019) for Follow Up.    Carmen Henao, DNP, APRN, PMHNP-BC, FNP-C

## 2019-10-04 DIAGNOSIS — F51.05 INSOMNIA DUE TO MENTAL CONDITION: ICD-10-CM

## 2019-10-07 RX ORDER — AMITRIPTYLINE HYDROCHLORIDE 150 MG/1
150 TABLET, FILM COATED ORAL NIGHTLY
Qty: 90 TABLET | Refills: 0 | Status: SHIPPED | OUTPATIENT
Start: 2019-10-07 | End: 2019-10-30 | Stop reason: SDUPTHER

## 2019-10-07 RX ORDER — LURASIDONE HYDROCHLORIDE 80 MG/1
80 TABLET, FILM COATED ORAL NIGHTLY
Qty: 30 TABLET | Refills: 2 | Status: SHIPPED | OUTPATIENT
Start: 2019-10-07 | End: 2019-10-30 | Stop reason: SDUPTHER

## 2019-10-30 ENCOUNTER — OFFICE VISIT (OUTPATIENT)
Dept: PSYCHIATRY | Facility: CLINIC | Age: 54
End: 2019-10-30

## 2019-10-30 VITALS
HEART RATE: 105 BPM | BODY MASS INDEX: 35.36 KG/M2 | HEIGHT: 66 IN | SYSTOLIC BLOOD PRESSURE: 140 MMHG | WEIGHT: 220 LBS | DIASTOLIC BLOOD PRESSURE: 70 MMHG

## 2019-10-30 DIAGNOSIS — Z79.899 LONG-TERM USE OF HIGH-RISK MEDICATION: ICD-10-CM

## 2019-10-30 DIAGNOSIS — F51.05 INSOMNIA DUE TO MENTAL CONDITION: ICD-10-CM

## 2019-10-30 DIAGNOSIS — F20.0 SCHIZOPHRENIA, PARANOID TYPE (HCC): Primary | ICD-10-CM

## 2019-10-30 PROCEDURE — 99213 OFFICE O/P EST LOW 20 MIN: CPT | Performed by: NURSE PRACTITIONER

## 2019-10-30 RX ORDER — LURASIDONE HYDROCHLORIDE 80 MG/1
80 TABLET, FILM COATED ORAL NIGHTLY
Qty: 30 TABLET | Refills: 5 | Status: SHIPPED | OUTPATIENT
Start: 2019-10-30 | End: 2019-12-23

## 2019-10-30 RX ORDER — AMITRIPTYLINE HYDROCHLORIDE 150 MG/1
150 TABLET, FILM COATED ORAL NIGHTLY
Qty: 30 TABLET | Refills: 5 | Status: SHIPPED | OUTPATIENT
Start: 2019-10-30 | End: 2020-03-03 | Stop reason: HOSPADM

## 2019-10-30 NOTE — PROGRESS NOTES
Jose Taylor is a 54 y.o. male is here today for medication management follow-up.    Chief Complaint:      ICD-10-CM ICD-9-CM   1. Schizophrenia, paranoid type (CMS/[facility]) F20.0 295.30   2. Insomnia due to mental condition F51.05 300.9     327.02   3. Long-term use of high-risk medication Z79.899 V58.69       History of Present Illness:  Pt presents for follow up visit and medication management of mood symptoms. Pt is currently taking Latuda 80 mg with dinner and states he feels less restlessness on the lower dose. States he feels restlessness on occasion not daily. Reports improvement in sleep with Elavil. Continues to have paranoid delusions; states he feels as if people are watching and listening in on him. Pt states he stopped Propranolol since medication was not helping with his tremors that occur on occasion. Reports that his paranoid delusion have improved since being on Latuda. Pt denies any depressive symptoms. Denies auditory and visual hallucinations.       Pt reports the presence/absence of the following anxiety symptoms: (-) excessive worry, (+) excessive fear, (+) difficulty relaxing, (+) restlessness at times, (-) insomnia, (-) easily fatigued, (-) irritability, (-) poor concentration, (+) racing thoughts, and (-) panic episodes.       The following portions of the patient's history were reviewed and updated as appropriate: allergies, current medications, past family history, past medical history, past social history, past surgical history and problem list.    Review of Systems;;  Review of Systems   Constitutional: Negative.  Negative for activity change, appetite change, fatigue, unexpected weight gain and unexpected weight loss.   Respiratory: Negative.    Cardiovascular: Negative.  Negative for chest pain.   Gastrointestinal: Negative.  Negative for diarrhea, nausea and vomiting.   Musculoskeletal: Negative.    Skin: Negative for rash and bruise.   Neurological: Positive for tremors. Negative for  "dizziness, seizures and speech difficulty.   Psychiatric/Behavioral: Negative for agitation, behavioral problems, decreased concentration, hallucinations, self-injury, sleep disturbance, suicidal ideas, negative for hyperactivity, depressed mood and stress. The patient is nervous/anxious.        Physical Exam;;  Physical Exam  Blood pressure 140/70, pulse 105, height 167.6 cm (66\"), weight 99.8 kg (220 lb).    Current Medications;;    Current Outpatient Medications:   •  amitriptyline (ELAVIL) 150 MG tablet, Take 1 tablet by mouth Every Night., Disp: 30 tablet, Rfl: 5  •  lisinopril (PRINIVIL,ZESTRIL) 20 MG tablet, Take 1 tablet by mouth Daily., Disp: 30 tablet, Rfl: 2  •  Lurasidone HCl 80 MG tablet, Take 80 mg by mouth Every Night., Disp: 30 tablet, Rfl: 5    Lab Results:   No visits with results within 3 Month(s) from this visit.   Latest known visit with results is:   No results found for any previous visit.       Mental Status Exam:   Hygiene:   fair  Cooperation:  Cooperative  Eye Contact:  Good  Psychomotor Behavior:  Appropriate  Mood:euthymic  Affect:  Blunted  Hopelessness: Denies  Speech:  Normal  Thought Process:  Goal directed  Thought Content:  Normal  Suicidal:  None  Homicidal:  None  Hallucinations:  None  Delusion:  Paranoid  Memory:  Intact  Orientation:  Person, Place, Time and Situation  Reliability:  good  Insight:  Fair  Judgement:  Fair  Impulse Control:  Good  Physical/Medical Issues:  No         Assessment Plan;;  Diagnoses and all orders for this visit:    Schizophrenia, paranoid type (CMS/HCC)  -     Lurasidone HCl 80 MG tablet; Take 80 mg by mouth Every Night.    Insomnia due to mental condition  -     amitriptyline (ELAVIL) 150 MG tablet; Take 1 tablet by mouth Every Night.    Long-term use of high-risk medication  -     Comprehensive Metabolic Panel; Future  -     Lipid Panel; Future  -     CBC & Differential; Future      Treatment Plan        Problem: Paranoid Delusions and " Insomnia      Intervention: The prescription and adjustment of medications and monitoring of side effects. Continue current doses of Latuda and Elavil. Complete ordered lab work.      Long term Goal: Overall improvement in mood and functioning per patient self -report      Short term Goal: Medication adherence. Improvement in symptoms per patient self-report.         A psychological evaluation was conducted in order to assess past and current level of functioning. Areas assessed included, but were not limited to: perception of social support, perception of ability to face and deal with challenges in life (positive functioning), anxiety symptoms, depressive symptoms, perspective on beliefs/belief system, coping skills for stress, intelligence level,  Therapeutic rapport was established. Interventions conducted today were geared towards incorporating medication management along with support for continued therapy. Education was also provided as to the med management with this provider and what to expect in subsequent sessions.    We discussed risks, benefits,goals and side effects of the above medication and the patient was agreeable with the plan.Patient was educated on the importance of compliance with treatment and follow-up appointments. Patient is aware to contact the [facility] with any worsening of symptoms. To call for questions or concerns and return early if necessary. Patent is agreeable to go to the Emergency Department or call 911 should they begin SI/HI.     Treatment Plan: stabilize mood,  patient will stay out of the hospital and be at optimal level of functioning, take all medication as prescribed. Patient verbalized  understanding and agreement to plan.    Return in about 6 months (around 4/30/2020) for Follow Up.    Carmen Henao, SUMAYA, APRN, PMHNP-BC, FNP-C

## 2019-12-23 ENCOUNTER — OFFICE VISIT (OUTPATIENT)
Dept: PSYCHIATRY | Facility: CLINIC | Age: 54
End: 2019-12-23

## 2019-12-23 VITALS
DIASTOLIC BLOOD PRESSURE: 74 MMHG | HEART RATE: 107 BPM | BODY MASS INDEX: 35.36 KG/M2 | HEIGHT: 66 IN | WEIGHT: 220 LBS | SYSTOLIC BLOOD PRESSURE: 138 MMHG

## 2019-12-23 DIAGNOSIS — F22 DELUSIONAL DISORDER (HCC): ICD-10-CM

## 2019-12-23 DIAGNOSIS — F20.0 SCHIZOPHRENIA, PARANOID TYPE (HCC): Primary | ICD-10-CM

## 2019-12-23 DIAGNOSIS — F41.1 GENERALIZED ANXIETY DISORDER: ICD-10-CM

## 2019-12-23 PROCEDURE — 99214 OFFICE O/P EST MOD 30 MIN: CPT | Performed by: NURSE PRACTITIONER

## 2019-12-23 RX ORDER — BUSPIRONE HYDROCHLORIDE 15 MG/1
15 TABLET ORAL 3 TIMES DAILY
Qty: 90 TABLET | Refills: 0 | Status: SHIPPED | OUTPATIENT
Start: 2019-12-23 | End: 2020-01-28 | Stop reason: SDUPTHER

## 2019-12-23 RX ORDER — LURASIDONE HYDROCHLORIDE 120 MG/1
120 TABLET, FILM COATED ORAL DAILY
Qty: 30 TABLET | Refills: 2 | Status: SHIPPED | OUTPATIENT
Start: 2019-12-23 | End: 2020-01-28

## 2019-12-24 NOTE — PROGRESS NOTES
"Jose Taylor is a 54 y.o. male is here today for medication management follow-up.    Chief Complaint:      ICD-10-CM ICD-9-CM   1. Schizophrenia, paranoid type (CMS/HCC) F20.0 295.30   2. Delusional disorder (CMS/HCC) F22 297.1   3. Generalized anxiety disorder F41.1 300.02       History of Present Illness:  Pt presents for follow up visit and medication management of paranoid delusions. Pt was recently discharged from  for increasing paranoia and auditory hallucinations. Pt reports that Latuda was increased to 120 mg and he was started on Buspirone 10 mg TID. Pt states that his blood pressure was low on the unit so Propranolol was discontinued. Pt denies current auditory hallucinations. Pt continues to report paranoid delusions. States there are people who are parked outside of his hotel and the cars headlights are purposely aimed at his window. Pt is unable to describe how he ended up at ; states \"I knew I was becoming more paranoid\".  States Buspirone has helped with his anxiety symptoms but continues to feel anxious. States he is fearful of letting the  in his room at the hotel because \"people are trying to get $5,000 dollars from him\".    Pt reports the presence/absence of the following anxiety symptoms: (+) excessive worry, (+) excessive fear, (+) difficulty relaxing, (+) restlessness at times, (-) insomnia, (-) easily fatigued, (-) irritability, (-) poor concentration, (+) racing thoughts, and (-) panic episodes.       The following portions of the patient's history were reviewed and updated as appropriate: allergies, current medications, past family history, past medical history, past social history, past surgical history and problem list.    Review of Systems;;  Review of Systems   Constitutional: Negative.  Negative for activity change, appetite change, fatigue, unexpected weight gain and unexpected weight loss.   Respiratory: Negative.    Cardiovascular: Negative.  Negative for chest pain. " "  Gastrointestinal: Negative.  Negative for diarrhea, nausea and vomiting.   Genitourinary: Negative.    Musculoskeletal: Negative.    Skin: Negative for rash and bruise.   Neurological: Negative.  Negative for dizziness, tremors, seizures and speech difficulty.   Psychiatric/Behavioral: Negative for agitation, behavioral problems, decreased concentration, hallucinations, self-injury, sleep disturbance, suicidal ideas, negative for hyperactivity, depressed mood and stress. The patient is nervous/anxious.        Physical Exam;;  Physical Exam  Blood pressure 138/74, pulse 107, height 167.6 cm (66\"), weight 99.8 kg (220 lb).    Current Medications;;    Current Outpatient Medications:   •  amitriptyline (ELAVIL) 150 MG tablet, Take 1 tablet by mouth Every Night., Disp: 30 tablet, Rfl: 5  •  busPIRone (BUSPAR) 15 MG tablet, Take 1 tablet by mouth 3 (Three) Times a Day., Disp: 90 tablet, Rfl: 0  •  lisinopril (PRINIVIL,ZESTRIL) 20 MG tablet, Take 1 tablet by mouth Daily., Disp: 30 tablet, Rfl: 2  •  Lurasidone HCl (LATUDA) 120 MG tablet, Take 120 mg by mouth Daily., Disp: 30 tablet, Rfl: 2    Lab Results:   No visits with results within 3 Month(s) from this visit.   Latest known visit with results is:   No results found for any previous visit.       Mental Status Exam:   Hygiene:   fair  Cooperation:  Cooperative  Eye Contact:  Poor-states he is unable to look at women in the eye so his intentions are not misunderstood.  Psychomotor Behavior:  Restless  Mood:Anxious  Affect:  Blunted  Hopelessness: Denies  Speech:  Normal  Thought Process:  Tangential  Thought Content:  Delusional  Suicidal:  None  Homicidal:  None  Hallucinations:  None  Delusion:  Paranoid  Memory:  Intact  Orientation:  Person, Place, Time and Situation  Reliability:  good  Insight:  Fair  Judgement:  Poor  Impulse Control:  Poor  Physical/Medical Issues:  No         Assessment Plan;;  Diagnoses and all orders for this visit:    Schizophrenia, " paranoid type (CMS/HCC)  -     Lurasidone HCl (LATUDA) 120 MG tablet; Take 120 mg by mouth Daily.    Delusional disorder (CMS/HCC)  -     Lurasidone HCl (LATUDA) 120 MG tablet; Take 120 mg by mouth Daily.    Generalized anxiety disorder  -     busPIRone (BUSPAR) 15 MG tablet; Take 1 tablet by mouth 3 (Three) Times a Day.      Reviewed medical record from [facility] psychiatric admission from 12/17-12/19.      Pt states he is unable to have a sooner follow up appointment because he cannot afford the cost of his co-pays; Advised pt to call the office or go to the ER  symptoms worsen.      Treatment Plan        Problem: Paranoid Delusions/Anxiety      Intervention: The prescription and adjustment of medications and monitoring of side effects. Increase Buspirone.Continue current doses of Latuda.      Long term Goal: Overall improvement in mood and functioning per patient self -report      Short term Goal: Medication adherence. Improvement in symptoms per patient self-report.         A psychological evaluation was conducted in order to assess past and current level of functioning. Areas assessed included, but were not limited to: perception of social support, perception of ability to face and deal with challenges in life (positive functioning), anxiety symptoms, depressive symptoms, perspective on beliefs/belief system, coping skills for stress, intelligence level,  Therapeutic rapport was established. Interventions conducted today were geared towards incorporating medication management along with support for continued therapy. Education was also provided as to the med management with this provider and what to expect in subsequent sessions.    We discussed risks, benefits,goals and side effects of the above medication and the patient was agreeable with the plan.Patient was educated on the importance of compliance with treatment and follow-up appointments. Patient is aware to contact the [facility] with any  worsening of symptoms. To call for questions or concerns and return early if necessary. Patent is agreeable to go to the Emergency Department or call 911 should they begin SI/HI.     Return in about 3 months (around 3/23/2020) for Follow Up.    Carmen Henao, DNP, APRN, PMHNP-BC, FNP-C

## 2020-01-07 ENCOUNTER — TELEPHONE (OUTPATIENT)
Dept: PSYCHIATRY | Facility: CLINIC | Age: 55
End: 2020-01-07

## 2020-01-07 NOTE — TELEPHONE ENCOUNTER
If paranoia is worsening I would advise that he return to the hospital to have medications adjusted under observation.

## 2020-01-07 NOTE — TELEPHONE ENCOUNTER
Ok I did speak with his sister and gave her options of presenting to the ER or adding him to my schedule tomorrow. Message completed.

## 2020-01-23 ENCOUNTER — TELEPHONE (OUTPATIENT)
Dept: PSYCHIATRY | Facility: CLINIC | Age: 55
End: 2020-01-23

## 2020-01-28 ENCOUNTER — OFFICE VISIT (OUTPATIENT)
Dept: PSYCHIATRY | Facility: CLINIC | Age: 55
End: 2020-01-28

## 2020-01-28 VITALS
SYSTOLIC BLOOD PRESSURE: 132 MMHG | HEIGHT: 66 IN | HEART RATE: 127 BPM | BODY MASS INDEX: 35.36 KG/M2 | DIASTOLIC BLOOD PRESSURE: 80 MMHG | WEIGHT: 220 LBS

## 2020-01-28 DIAGNOSIS — F41.1 GENERALIZED ANXIETY DISORDER: ICD-10-CM

## 2020-01-28 DIAGNOSIS — F20.0 SCHIZOPHRENIA, PARANOID TYPE (HCC): Primary | ICD-10-CM

## 2020-01-28 PROCEDURE — 99214 OFFICE O/P EST MOD 30 MIN: CPT | Performed by: NURSE PRACTITIONER

## 2020-01-28 RX ORDER — BUSPIRONE HYDROCHLORIDE 15 MG/1
15 TABLET ORAL 3 TIMES DAILY
Qty: 90 TABLET | Refills: 0 | Status: SHIPPED | OUTPATIENT
Start: 2020-01-28 | End: 2020-03-03 | Stop reason: HOSPADM

## 2020-01-28 RX ORDER — ARIPIPRAZOLE 10 MG/1
10 TABLET ORAL DAILY
Qty: 30 TABLET | Refills: 0 | Status: SHIPPED | OUTPATIENT
Start: 2020-01-28 | End: 2020-02-20

## 2020-01-28 NOTE — PROGRESS NOTES
"Jose Taylor is a 54 y.o. male is here today for medication management follow-up.    Chief Complaint:      ICD-10-CM ICD-9-CM   1. Schizophrenia, paranoid type (CMS/[facility]) F20.0 295.30   2. Generalized anxiety disorder F41.1 300.02       History of Present Illness:  Pt presents for follow up visit and medication management of paranoid delusions. Pt reports improvement with anxiety and paranoid delusions since Latuda was increased to 120 mg and Buspirone increased to 15  Mg TID. Pt states that he only has four pills of Latuda left and he can no longer afford the cost of Latuda since his insurance stopped covering the medication; would be over $1000 per prescription. Pt reports the following medication history: pt developed gynecomastia with Risperidone and Invega, Seroquel made him \"too sleepy\", and Zyprexa did not help his symptoms. .      Pt reports the presence/absence of the following anxiety symptoms: (-) excessive worry, (-) excessive fear, (-) difficulty relaxing, (-) restlessness at times, (-) insomnia, (-) easily fatigued, (-) irritability, (-) poor concentration, (-) racing thoughts, and (-) panic episodes.       The following portions of the patient's history were reviewed and updated as appropriate: allergies, current medications, past family history, past medical history, past social history, past surgical history and problem list.    Review of Systems;;  Review of Systems   Constitutional: Negative.  Negative for activity change, appetite change, fatigue, unexpected weight gain and unexpected weight loss.   Respiratory: Negative.    Cardiovascular: Negative.  Negative for chest pain.   Gastrointestinal: Negative.  Negative for diarrhea, nausea and vomiting.   Genitourinary: Negative.    Musculoskeletal: Negative.    Skin: Negative for rash and bruise.   Neurological: Negative.  Negative for dizziness, tremors, seizures and speech difficulty.   Psychiatric/Behavioral: Negative.  Negative for agitation, " "behavioral problems, decreased concentration, hallucinations, self-injury, sleep disturbance, suicidal ideas, negative for hyperactivity, depressed mood and stress. The patient is not nervous/anxious.        Physical Exam;;  Physical Exam  Blood pressure 132/80, pulse (!) 127, height 167.6 cm (66\"), weight 99.8 kg (220 lb).    Current Medications;;    Current Outpatient Medications:   •  amitriptyline (ELAVIL) 150 MG tablet, Take 1 tablet by mouth Every Night., Disp: 30 tablet, Rfl: 5  •  ARIPiprazole (ABILIFY) 10 MG tablet, Take 1 tablet by mouth Daily., Disp: 30 tablet, Rfl: 0  •  busPIRone (BUSPAR) 15 MG tablet, Take 1 tablet by mouth 3 (Three) Times a Day., Disp: 90 tablet, Rfl: 0  •  lisinopril (PRINIVIL,ZESTRIL) 20 MG tablet, Take 1 tablet by mouth Daily., Disp: 30 tablet, Rfl: 2    Lab Results:   No visits with results within 3 Month(s) from this visit.   Latest known visit with results is:   No results found for any previous visit.       Mental Status Exam:   Hygiene:   fair  Cooperation:  Cooperative  Eye Contact:  Good  Psychomotor Behavior:  Restless  Mood:Anxious  Affect:  Blunted  Hopelessness: Denies  Speech:  Normal  Thought Process:  Goal DIrected  Thought Content:  WNL  Suicidal:  None  Homicidal:  None  Hallucinations:  None  Delusion:  Paranoid  Memory:  Intact  Orientation:  Person, Place, Time and Situation  Reliability:  good  Insight:  Fair  Judgement:  Fair  Impulse Control:  Fair  Physical/Medical Issues:  No         Assessment Plan;;  Diagnoses and all orders for this visit:    Schizophrenia, paranoid type (CMS/HCC)  -     ARIPiprazole (ABILIFY) 10 MG tablet; Take 1 tablet by mouth Daily.    Generalized anxiety disorder  -     busPIRone (BUSPAR) 15 MG tablet; Take 1 tablet by mouth 3 (Three) Times a Day.        Treatment Plan        Problem: Paranoid Delusions/Anxiety      Intervention: The prescription and adjustment of medications and monitoring of side effects. Decrease Latuda and taper " off medication due to cost and start Abilify; pt provided with written instructions to taper off Latuda. Continue current dose of Buspirone and Elavil.      Long term Goal: Overall improvement in mood and functioning per patient self -report      Short term Goal: Medication adherence. Improvement in symptoms per patient self-report.         A psychological evaluation was conducted in order to assess past and current level of functioning. Areas assessed included, but were not limited to: perception of social support, perception of ability to face and deal with challenges in life (positive functioning), anxiety symptoms, depressive symptoms, perspective on beliefs/belief system, coping skills for stress, intelligence level,  Therapeutic rapport was established. Interventions conducted today were geared towards incorporating medication management along with support for continued therapy. Education was also provided as to the med management with this provider and what to expect in subsequent sessions.    We discussed risks, benefits,goals and side effects of the above medication and the patient was agreeable with the plan.Patient was educated on the importance of compliance with treatment and follow-up appointments. Patient is aware to contact the [facility] with any worsening of symptoms. To call for questions or concerns and return early if necessary. Patent is agreeable to go to the Emergency Department or call 911 should they begin SI/HI.     Return in about 4 weeks (around 2/25/2020) for Follow Up.    Carmen Henao, SUMAYA, APRN, PMHNP-BC, FNP-C

## 2020-02-20 ENCOUNTER — TELEPHONE (OUTPATIENT)
Dept: PSYCHIATRY | Facility: CLINIC | Age: 55
End: 2020-02-20

## 2020-02-20 RX ORDER — QUETIAPINE FUMARATE 50 MG/1
TABLET, FILM COATED ORAL
Qty: 57 TABLET | Refills: 0 | Status: ON HOLD | OUTPATIENT
Start: 2020-02-20 | End: 2020-02-25

## 2020-02-20 NOTE — TELEPHONE ENCOUNTER
PATIENT CALLED IN STATING HE IS HAVING THOUGHTS HE HAS NEVER HAD BEFORE. PT THINKS ABILIFY MAY BE CAUSING HIM TO HAVE UNWANTED THOUGHTS. PT DID NOT TELL ME WHAT THOUGHTS HE WAS HAVING BUT DID DENY HAVING THOUGHTS OF HARMING HIS SELF OR OTHERS. ADVISED PT IF HE STARTS HAVING THOUGHTS OF HARMING HISSELF OR OTHERS TO PRESENT TO [facility] ER. PT ASKED IF SEROQUEL COULD BE SENT IN TO HIS PHARMACY. PT APPT IS NEXT THURS 02/27. PT IS AWARE THAT MED CHANGES MAY BE DISCUSSED AT APPT. COULD YOU PLEASE ADVISE? THANKS

## 2020-02-25 ENCOUNTER — TELEPHONE (OUTPATIENT)
Dept: PSYCHIATRY | Facility: CLINIC | Age: 55
End: 2020-02-25

## 2020-02-25 ENCOUNTER — HOSPITAL ENCOUNTER (EMERGENCY)
Facility: HOSPITAL | Age: 55
Discharge: PSYCHIATRIC OR SUBSTANCE USE FACILITY | End: 2020-02-25
Attending: EMERGENCY MEDICINE | Admitting: EMERGENCY MEDICINE

## 2020-02-25 ENCOUNTER — HOSPITAL ENCOUNTER (INPATIENT)
Facility: HOSPITAL | Age: 55
LOS: 7 days | Discharge: HOME | End: 2020-03-03
Attending: PSYCHIATRY & NEUROLOGY | Admitting: PSYCHIATRY & NEUROLOGY

## 2020-02-25 VITALS
DIASTOLIC BLOOD PRESSURE: 83 MMHG | RESPIRATION RATE: 20 BRPM | OXYGEN SATURATION: 94 % | BODY MASS INDEX: 35.75 KG/M2 | SYSTOLIC BLOOD PRESSURE: 111 MMHG | HEIGHT: 67 IN | WEIGHT: 227.8 LBS | HEART RATE: 125 BPM | TEMPERATURE: 98 F

## 2020-02-25 DIAGNOSIS — I10 ESSENTIAL HYPERTENSION: ICD-10-CM

## 2020-02-25 DIAGNOSIS — F29 PSYCHOSIS, UNSPECIFIED PSYCHOSIS TYPE (HCC): ICD-10-CM

## 2020-02-25 DIAGNOSIS — R44.3 HALLUCINATIONS: Primary | ICD-10-CM

## 2020-02-25 LAB
ALBUMIN SERPL-MCNC: 4 G/DL (ref 3.5–5.2)
ALBUMIN/GLOB SERPL: 1.1 G/DL
ALP SERPL-CCNC: 68 U/L (ref 39–117)
ALT SERPL W P-5'-P-CCNC: 54 U/L (ref 1–41)
AMPHET+METHAMPHET UR QL: NEGATIVE
AMPHETAMINES UR QL: NEGATIVE
ANION GAP SERPL CALCULATED.3IONS-SCNC: 11.8 MMOL/L (ref 5–15)
APAP SERPL-MCNC: <5 MCG/ML (ref 10–30)
AST SERPL-CCNC: 48 U/L (ref 1–40)
BARBITURATES UR QL SCN: NEGATIVE
BASOPHILS # BLD AUTO: 0.05 10*3/MM3 (ref 0–0.2)
BASOPHILS NFR BLD AUTO: 0.6 % (ref 0–1.5)
BENZODIAZ UR QL SCN: NEGATIVE
BILIRUB SERPL-MCNC: 0.3 MG/DL (ref 0.2–1.2)
BILIRUB UR QL STRIP: NEGATIVE
BUN BLD-MCNC: 28 MG/DL (ref 6–20)
BUN/CREAT SERPL: 28.3 (ref 7–25)
BUPRENORPHINE SERPL-MCNC: NEGATIVE NG/ML
CALCIUM SPEC-SCNC: 9.2 MG/DL (ref 8.6–10.5)
CANNABINOIDS SERPL QL: NEGATIVE
CHLORIDE SERPL-SCNC: 104 MMOL/L (ref 98–107)
CLARITY UR: CLEAR
CO2 SERPL-SCNC: 22.2 MMOL/L (ref 22–29)
COCAINE UR QL: NEGATIVE
COLOR UR: YELLOW
CREAT BLD-MCNC: 0.99 MG/DL (ref 0.76–1.27)
DEPRECATED RDW RBC AUTO: 40.7 FL (ref 37–54)
EOSINOPHIL # BLD AUTO: 0.14 10*3/MM3 (ref 0–0.4)
EOSINOPHIL NFR BLD AUTO: 1.7 % (ref 0.3–6.2)
ERYTHROCYTE [DISTWIDTH] IN BLOOD BY AUTOMATED COUNT: 12.3 % (ref 12.3–15.4)
ETHANOL BLD-MCNC: <10 MG/DL (ref 0–10)
ETHANOL UR QL: <0.01 %
GFR SERPL CREATININE-BSD FRML MDRD: 78 ML/MIN/1.73
GLOBULIN UR ELPH-MCNC: 3.8 GM/DL
GLUCOSE BLD-MCNC: 123 MG/DL (ref 65–99)
GLUCOSE UR STRIP-MCNC: NEGATIVE MG/DL
HCT VFR BLD AUTO: 45.7 % (ref 37.5–51)
HGB BLD-MCNC: 15.5 G/DL (ref 13–17.7)
HGB UR QL STRIP.AUTO: NEGATIVE
IMM GRANULOCYTES # BLD AUTO: 0.04 10*3/MM3 (ref 0–0.05)
IMM GRANULOCYTES NFR BLD AUTO: 0.5 % (ref 0–0.5)
KETONES UR QL STRIP: ABNORMAL
LEUKOCYTE ESTERASE UR QL STRIP.AUTO: NEGATIVE
LYMPHOCYTES # BLD AUTO: 2.45 10*3/MM3 (ref 0.7–3.1)
LYMPHOCYTES NFR BLD AUTO: 30.4 % (ref 19.6–45.3)
MCH RBC QN AUTO: 30.5 PG (ref 26.6–33)
MCHC RBC AUTO-ENTMCNC: 33.9 G/DL (ref 31.5–35.7)
MCV RBC AUTO: 90 FL (ref 79–97)
METHADONE UR QL SCN: NEGATIVE
MONOCYTES # BLD AUTO: 0.74 10*3/MM3 (ref 0.1–0.9)
MONOCYTES NFR BLD AUTO: 9.2 % (ref 5–12)
NEUTROPHILS # BLD AUTO: 4.64 10*3/MM3 (ref 1.7–7)
NEUTROPHILS NFR BLD AUTO: 57.6 % (ref 42.7–76)
NITRITE UR QL STRIP: NEGATIVE
NRBC BLD AUTO-RTO: 0 /100 WBC (ref 0–0.2)
OPIATES UR QL: NEGATIVE
OXYCODONE UR QL SCN: NEGATIVE
PCP UR QL SCN: NEGATIVE
PH UR STRIP.AUTO: 5.5 [PH] (ref 5–8)
PLATELET # BLD AUTO: 330 10*3/MM3 (ref 140–450)
PMV BLD AUTO: 9.3 FL (ref 6–12)
POTASSIUM BLD-SCNC: 4.1 MMOL/L (ref 3.5–5.2)
PROPOXYPH UR QL: NEGATIVE
PROT SERPL-MCNC: 7.8 G/DL (ref 6–8.5)
PROT UR QL STRIP: NEGATIVE
RBC # BLD AUTO: 5.08 10*6/MM3 (ref 4.14–5.8)
SALICYLATES SERPL-MCNC: <0.3 MG/DL
SODIUM BLD-SCNC: 138 MMOL/L (ref 136–145)
SP GR UR STRIP: >=1.03 (ref 1–1.03)
TRICYCLICS UR QL SCN: POSITIVE
UROBILINOGEN UR QL STRIP: ABNORMAL
WBC NRBC COR # BLD: 8.06 10*3/MM3 (ref 3.4–10.8)

## 2020-02-25 PROCEDURE — 99284 EMERGENCY DEPT VISIT MOD MDM: CPT

## 2020-02-25 PROCEDURE — 85025 COMPLETE CBC W/AUTO DIFF WBC: CPT | Performed by: EMERGENCY MEDICINE

## 2020-02-25 PROCEDURE — 81003 URINALYSIS AUTO W/O SCOPE: CPT | Performed by: EMERGENCY MEDICINE

## 2020-02-25 PROCEDURE — 80053 COMPREHEN METABOLIC PANEL: CPT | Performed by: EMERGENCY MEDICINE

## 2020-02-25 PROCEDURE — 93005 ELECTROCARDIOGRAM TRACING: CPT | Performed by: EMERGENCY MEDICINE

## 2020-02-25 PROCEDURE — 80307 DRUG TEST PRSMV CHEM ANLYZR: CPT | Performed by: EMERGENCY MEDICINE

## 2020-02-25 RX ORDER — BENZTROPINE MESYLATE 1 MG/1
2 TABLET ORAL ONCE AS NEEDED
Status: DISCONTINUED | OUTPATIENT
Start: 2020-02-25 | End: 2020-02-26

## 2020-02-25 RX ORDER — BENZTROPINE MESYLATE 1 MG/ML
1 INJECTION INTRAMUSCULAR; INTRAVENOUS ONCE AS NEEDED
Status: DISCONTINUED | OUTPATIENT
Start: 2020-02-25 | End: 2020-02-26

## 2020-02-25 RX ORDER — LOPERAMIDE HYDROCHLORIDE 2 MG/1
2 CAPSULE ORAL
Status: DISCONTINUED | OUTPATIENT
Start: 2020-02-25 | End: 2020-03-03 | Stop reason: HOSPADM

## 2020-02-25 RX ORDER — IBUPROFEN 400 MG/1
400 TABLET ORAL EVERY 6 HOURS PRN
Status: DISCONTINUED | OUTPATIENT
Start: 2020-02-25 | End: 2020-03-03 | Stop reason: HOSPADM

## 2020-02-25 RX ORDER — NICOTINE 21 MG/24HR
1 PATCH, TRANSDERMAL 24 HOURS TRANSDERMAL
Status: DISCONTINUED | OUTPATIENT
Start: 2020-02-26 | End: 2020-03-03 | Stop reason: HOSPADM

## 2020-02-25 RX ORDER — HYDROXYZINE 50 MG/1
50 TABLET, FILM COATED ORAL EVERY 6 HOURS PRN
Status: DISCONTINUED | OUTPATIENT
Start: 2020-02-25 | End: 2020-02-26

## 2020-02-25 RX ORDER — LISINOPRIL 10 MG/1
20 TABLET ORAL DAILY
Status: DISCONTINUED | OUTPATIENT
Start: 2020-02-26 | End: 2020-03-03 | Stop reason: HOSPADM

## 2020-02-25 RX ORDER — QUETIAPINE FUMARATE 50 MG/1
100 TABLET, FILM COATED ORAL NIGHTLY
COMMUNITY
End: 2020-03-03 | Stop reason: HOSPADM

## 2020-02-25 RX ORDER — AMITRIPTYLINE HYDROCHLORIDE 50 MG/1
150 TABLET, FILM COATED ORAL NIGHTLY
Status: DISCONTINUED | OUTPATIENT
Start: 2020-02-25 | End: 2020-02-26

## 2020-02-25 RX ORDER — ONDANSETRON 4 MG/1
4 TABLET, FILM COATED ORAL EVERY 6 HOURS PRN
Status: DISCONTINUED | OUTPATIENT
Start: 2020-02-25 | End: 2020-03-03 | Stop reason: HOSPADM

## 2020-02-25 RX ORDER — ALUMINA, MAGNESIA, AND SIMETHICONE 2400; 2400; 240 MG/30ML; MG/30ML; MG/30ML
15 SUSPENSION ORAL EVERY 6 HOURS PRN
Status: DISCONTINUED | OUTPATIENT
Start: 2020-02-25 | End: 2020-03-03 | Stop reason: HOSPADM

## 2020-02-25 RX ORDER — BENZONATATE 100 MG/1
100 CAPSULE ORAL 3 TIMES DAILY PRN
Status: DISCONTINUED | OUTPATIENT
Start: 2020-02-25 | End: 2020-03-03 | Stop reason: HOSPADM

## 2020-02-25 RX ORDER — ECHINACEA PURPUREA EXTRACT 125 MG
2 TABLET ORAL AS NEEDED
Status: DISCONTINUED | OUTPATIENT
Start: 2020-02-25 | End: 2020-03-03 | Stop reason: HOSPADM

## 2020-02-25 RX ORDER — QUETIAPINE FUMARATE 100 MG/1
100 TABLET, FILM COATED ORAL NIGHTLY
Status: DISCONTINUED | OUTPATIENT
Start: 2020-02-25 | End: 2020-02-26

## 2020-02-25 RX ORDER — FAMOTIDINE 20 MG/1
20 TABLET, FILM COATED ORAL 2 TIMES DAILY PRN
Status: DISCONTINUED | OUTPATIENT
Start: 2020-02-25 | End: 2020-03-03 | Stop reason: HOSPADM

## 2020-02-25 RX ORDER — TRAZODONE HYDROCHLORIDE 50 MG/1
50 TABLET ORAL NIGHTLY PRN
Status: DISCONTINUED | OUTPATIENT
Start: 2020-02-25 | End: 2020-03-03 | Stop reason: HOSPADM

## 2020-02-25 RX ADMIN — QUETIAPINE FUMARATE 100 MG: 100 TABLET ORAL at 23:06

## 2020-02-25 RX ADMIN — AMITRIPTYLINE HYDROCHLORIDE 150 MG: 50 TABLET, FILM COATED ORAL at 23:04

## 2020-02-25 RX ADMIN — BUSPIRONE HYDROCHLORIDE 15 MG: 10 TABLET ORAL at 23:04

## 2020-02-25 NOTE — TELEPHONE ENCOUNTER
Spoke with his sister and the patient. Pt reporting increase in paranoia symptoms and hallucinations. He denies that he is a harm to himself or others. States he will go to [facility]. Message completed.

## 2020-02-25 NOTE — TELEPHONE ENCOUNTER
SHIVANI (SISTER) CALLED IN STATING PATIENT IS GOING BACK AND FORTH TO ROOMS, HEARING VOICES AND NOISES, AND HAVING THOUGHTS OF HARMING HISSELF AND OTHERS. ADVISED SISTER THAT PATIENT WILL NEED TO PRESENT TO [facility] ED. PLEASE ADVISE. THANKS
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
## 2020-02-26 PROBLEM — F20.0 SCHIZOPHRENIA, PARANOID TYPE: Status: ACTIVE | Noted: 2020-02-26

## 2020-02-26 PROCEDURE — 99223 1ST HOSP IP/OBS HIGH 75: CPT | Performed by: PSYCHIATRY & NEUROLOGY

## 2020-02-26 PROCEDURE — 93010 ELECTROCARDIOGRAM REPORT: CPT | Performed by: INTERNAL MEDICINE

## 2020-02-26 PROCEDURE — 93005 ELECTROCARDIOGRAM TRACING: CPT | Performed by: PSYCHIATRY & NEUROLOGY

## 2020-02-26 RX ORDER — OLANZAPINE 5 MG/1
5 TABLET, ORALLY DISINTEGRATING ORAL 3 TIMES DAILY PRN
Status: DISCONTINUED | OUTPATIENT
Start: 2020-02-26 | End: 2020-03-03 | Stop reason: HOSPADM

## 2020-02-26 RX ORDER — AMITRIPTYLINE HYDROCHLORIDE 50 MG/1
50 TABLET, FILM COATED ORAL NIGHTLY
Status: DISCONTINUED | OUTPATIENT
Start: 2020-02-26 | End: 2020-02-26

## 2020-02-26 RX ORDER — ARIPIPRAZOLE 10 MG/1
5 TABLET ORAL DAILY
Status: DISCONTINUED | OUTPATIENT
Start: 2020-02-26 | End: 2020-02-27

## 2020-02-26 RX ORDER — OLANZAPINE 5 MG/1
5 TABLET, ORALLY DISINTEGRATING ORAL NIGHTLY
Status: DISCONTINUED | OUTPATIENT
Start: 2020-02-26 | End: 2020-02-27

## 2020-02-26 RX ADMIN — LISINOPRIL 20 MG: 10 TABLET ORAL at 09:49

## 2020-02-26 RX ADMIN — ARIPIPRAZOLE 5 MG: 10 TABLET ORAL at 14:55

## 2020-02-26 RX ADMIN — OLANZAPINE 5 MG: 5 TABLET, ORALLY DISINTEGRATING ORAL at 20:14

## 2020-02-26 RX ADMIN — BUSPIRONE HYDROCHLORIDE 15 MG: 10 TABLET ORAL at 09:49

## 2020-02-27 LAB
ALBUMIN SERPL-MCNC: 3.37 G/DL (ref 3.5–5.2)
ALBUMIN/GLOB SERPL: 0.9 G/DL
ALP SERPL-CCNC: 60 U/L (ref 39–117)
ALT SERPL W P-5'-P-CCNC: 51 U/L (ref 1–41)
ANION GAP SERPL CALCULATED.3IONS-SCNC: 8.9 MMOL/L (ref 5–15)
AST SERPL-CCNC: 42 U/L (ref 1–40)
BILIRUB SERPL-MCNC: 0.3 MG/DL (ref 0.2–1.2)
BUN BLD-MCNC: 21 MG/DL (ref 6–20)
BUN/CREAT SERPL: 21.6 (ref 7–25)
CALCIUM SPEC-SCNC: 8.9 MG/DL (ref 8.6–10.5)
CHLORIDE SERPL-SCNC: 103 MMOL/L (ref 98–107)
CHOLEST SERPL-MCNC: 175 MG/DL (ref 0–200)
CO2 SERPL-SCNC: 27.1 MMOL/L (ref 22–29)
CREAT BLD-MCNC: 0.97 MG/DL (ref 0.76–1.27)
GFR SERPL CREATININE-BSD FRML MDRD: 80 ML/MIN/1.73
GLOBULIN UR ELPH-MCNC: 3.6 GM/DL
GLUCOSE BLD-MCNC: 125 MG/DL (ref 65–99)
HAV IGM SERPL QL IA: NORMAL
HBA1C MFR BLD: 5.4 % (ref 4.8–5.6)
HBV CORE IGM SERPL QL IA: NORMAL
HBV SURFACE AG SERPL QL IA: NORMAL
HCV AB SER DONR QL: NORMAL
HDLC SERPL-MCNC: 30 MG/DL (ref 40–60)
HOLD SPECIMEN: NORMAL
LDLC SERPL CALC-MCNC: 109 MG/DL (ref 0–100)
LDLC/HDLC SERPL: 3.65 {RATIO}
POTASSIUM BLD-SCNC: 3.7 MMOL/L (ref 3.5–5.2)
PROT SERPL-MCNC: 7 G/DL (ref 6–8.5)
SODIUM BLD-SCNC: 139 MMOL/L (ref 136–145)
T4 FREE SERPL-MCNC: 0.89 NG/DL (ref 0.93–1.7)
TRIGL SERPL-MCNC: 178 MG/DL (ref 0–150)
TSH SERPL DL<=0.05 MIU/L-ACNC: 3.58 UIU/ML (ref 0.27–4.2)
VLDLC SERPL-MCNC: 35.6 MG/DL

## 2020-02-27 PROCEDURE — 83036 HEMOGLOBIN GLYCOSYLATED A1C: CPT | Performed by: PSYCHIATRY & NEUROLOGY

## 2020-02-27 PROCEDURE — 80053 COMPREHEN METABOLIC PANEL: CPT | Performed by: PSYCHIATRY & NEUROLOGY

## 2020-02-27 PROCEDURE — 84439 ASSAY OF FREE THYROXINE: CPT | Performed by: PSYCHIATRY & NEUROLOGY

## 2020-02-27 PROCEDURE — 84443 ASSAY THYROID STIM HORMONE: CPT | Performed by: PSYCHIATRY & NEUROLOGY

## 2020-02-27 PROCEDURE — 80074 ACUTE HEPATITIS PANEL: CPT | Performed by: PSYCHIATRY & NEUROLOGY

## 2020-02-27 PROCEDURE — 80061 LIPID PANEL: CPT | Performed by: PSYCHIATRY & NEUROLOGY

## 2020-02-27 PROCEDURE — 99232 SBSQ HOSP IP/OBS MODERATE 35: CPT | Performed by: PSYCHIATRY & NEUROLOGY

## 2020-02-27 RX ORDER — OLANZAPINE 5 MG/1
10 TABLET, ORALLY DISINTEGRATING ORAL NIGHTLY
Status: DISCONTINUED | OUTPATIENT
Start: 2020-02-27 | End: 2020-02-28

## 2020-02-27 RX ADMIN — OLANZAPINE 10 MG: 5 TABLET, ORALLY DISINTEGRATING ORAL at 20:08

## 2020-02-27 RX ADMIN — TRAZODONE HYDROCHLORIDE 50 MG: 50 TABLET ORAL at 20:08

## 2020-02-27 RX ADMIN — ARIPIPRAZOLE 5 MG: 10 TABLET ORAL at 08:17

## 2020-02-27 RX ADMIN — LISINOPRIL 20 MG: 10 TABLET ORAL at 08:17

## 2020-02-28 LAB
ALBUMIN SERPL-MCNC: 3.63 G/DL (ref 3.5–5.2)
ALBUMIN/GLOB SERPL: 1 G/DL
ALP SERPL-CCNC: 63 U/L (ref 39–117)
ALT SERPL W P-5'-P-CCNC: 53 U/L (ref 1–41)
ANION GAP SERPL CALCULATED.3IONS-SCNC: 11.1 MMOL/L (ref 5–15)
AST SERPL-CCNC: 44 U/L (ref 1–40)
BILIRUB SERPL-MCNC: 0.4 MG/DL (ref 0.2–1.2)
BUN BLD-MCNC: 19 MG/DL (ref 6–20)
BUN/CREAT SERPL: 21.6 (ref 7–25)
CALCIUM SPEC-SCNC: 8.9 MG/DL (ref 8.6–10.5)
CHLORIDE SERPL-SCNC: 103 MMOL/L (ref 98–107)
CHOLEST SERPL-MCNC: 189 MG/DL (ref 0–200)
CO2 SERPL-SCNC: 23.9 MMOL/L (ref 22–29)
CREAT BLD-MCNC: 0.88 MG/DL (ref 0.76–1.27)
GFR SERPL CREATININE-BSD FRML MDRD: 90 ML/MIN/1.73
GLOBULIN UR ELPH-MCNC: 3.8 GM/DL
GLUCOSE BLD-MCNC: 159 MG/DL (ref 65–99)
HDLC SERPL-MCNC: 32 MG/DL (ref 40–60)
LDLC SERPL CALC-MCNC: 122 MG/DL (ref 0–100)
LDLC/HDLC SERPL: 3.83 {RATIO}
POTASSIUM BLD-SCNC: 3.6 MMOL/L (ref 3.5–5.2)
PROT SERPL-MCNC: 7.4 G/DL (ref 6–8.5)
SODIUM BLD-SCNC: 138 MMOL/L (ref 136–145)
TRIGL SERPL-MCNC: 173 MG/DL (ref 0–150)
VLDLC SERPL-MCNC: 34.6 MG/DL

## 2020-02-28 PROCEDURE — 80061 LIPID PANEL: CPT | Performed by: PSYCHIATRY & NEUROLOGY

## 2020-02-28 PROCEDURE — 80053 COMPREHEN METABOLIC PANEL: CPT | Performed by: PSYCHIATRY & NEUROLOGY

## 2020-02-28 PROCEDURE — 84402 ASSAY OF FREE TESTOSTERONE: CPT | Performed by: PSYCHIATRY & NEUROLOGY

## 2020-02-28 PROCEDURE — 99233 SBSQ HOSP IP/OBS HIGH 50: CPT | Performed by: PSYCHIATRY & NEUROLOGY

## 2020-02-28 PROCEDURE — 99222 1ST HOSP IP/OBS MODERATE 55: CPT | Performed by: INTERNAL MEDICINE

## 2020-02-28 RX ORDER — METOPROLOL SUCCINATE 25 MG/1
25 TABLET, EXTENDED RELEASE ORAL
Status: DISCONTINUED | OUTPATIENT
Start: 2020-02-28 | End: 2020-03-03 | Stop reason: HOSPADM

## 2020-02-28 RX ORDER — OLANZAPINE 5 MG/1
15 TABLET, ORALLY DISINTEGRATING ORAL NIGHTLY
Status: DISCONTINUED | OUTPATIENT
Start: 2020-02-28 | End: 2020-03-02

## 2020-02-28 RX ADMIN — LISINOPRIL 20 MG: 10 TABLET ORAL at 08:22

## 2020-02-28 RX ADMIN — METOPROLOL SUCCINATE 25 MG: 25 TABLET, EXTENDED RELEASE ORAL at 17:38

## 2020-02-28 RX ADMIN — TRAZODONE HYDROCHLORIDE 50 MG: 50 TABLET ORAL at 20:29

## 2020-02-28 RX ADMIN — OLANZAPINE 15 MG: 5 TABLET, ORALLY DISINTEGRATING ORAL at 20:27

## 2020-02-29 LAB
BH CV ECHO MEAS - ACS: 2.5 CM
BH CV ECHO MEAS - AO MAX PG: 3.9 MMHG
BH CV ECHO MEAS - AO MEAN PG: 2.5 MMHG
BH CV ECHO MEAS - AO ROOT AREA (BSA CORRECTED): 1.9
BH CV ECHO MEAS - AO ROOT AREA: 12.9 CM^2
BH CV ECHO MEAS - AO ROOT DIAM: 4.1 CM
BH CV ECHO MEAS - AO V2 MAX: 99 CM/SEC
BH CV ECHO MEAS - AO V2 MEAN: 76.5 CM/SEC
BH CV ECHO MEAS - AO V2 VTI: 18.7 CM
BH CV ECHO MEAS - BSA(HAYCOCK): 2.2 M^2
BH CV ECHO MEAS - BSA: 2.1 M^2
BH CV ECHO MEAS - BZI_BMI: 35.4 KILOGRAMS/M^2
BH CV ECHO MEAS - BZI_METRIC_HEIGHT: 170.2 CM
BH CV ECHO MEAS - BZI_METRIC_WEIGHT: 102.5 KG
BH CV ECHO MEAS - EDV(CUBED): 77.9 ML
BH CV ECHO MEAS - EDV(MOD-SP4): 37 ML
BH CV ECHO MEAS - EDV(TEICH): 81.7 ML
BH CV ECHO MEAS - EF(CUBED): 59.5 %
BH CV ECHO MEAS - EF(MOD-SP4): 58.6 %
BH CV ECHO MEAS - EF(TEICH): 51.4 %
BH CV ECHO MEAS - ESV(CUBED): 31.6 ML
BH CV ECHO MEAS - ESV(MOD-SP4): 15.3 ML
BH CV ECHO MEAS - ESV(TEICH): 39.7 ML
BH CV ECHO MEAS - FS: 26 %
BH CV ECHO MEAS - IVS/LVPW: 1.1
BH CV ECHO MEAS - IVSD: 1.6 CM
BH CV ECHO MEAS - LA DIMENSION: 3.7 CM
BH CV ECHO MEAS - LA/AO: 0.91
BH CV ECHO MEAS - LV DIASTOLIC VOL/BSA (35-75): 17.4 ML/M^2
BH CV ECHO MEAS - LV MASS(C)D: 277.1 GRAMS
BH CV ECHO MEAS - LV MASS(C)DI: 130.1 GRAMS/M^2
BH CV ECHO MEAS - LV SYSTOLIC VOL/BSA (12-30): 7.2 ML/M^2
BH CV ECHO MEAS - LVIDD: 4.3 CM
BH CV ECHO MEAS - LVIDS: 3.2 CM
BH CV ECHO MEAS - LVLD AP4: 7.7 CM
BH CV ECHO MEAS - LVLS AP4: 7.7 CM
BH CV ECHO MEAS - LVOT AREA (M): 5.3 CM^2
BH CV ECHO MEAS - LVOT AREA: 5.3 CM^2
BH CV ECHO MEAS - LVOT DIAM: 2.6 CM
BH CV ECHO MEAS - LVPWD: 1.5 CM
BH CV ECHO MEAS - MV A MAX VEL: 66 CM/SEC
BH CV ECHO MEAS - MV E MAX VEL: 57.8 CM/SEC
BH CV ECHO MEAS - MV E/A: 0.88
BH CV ECHO MEAS - PA ACC TIME: 0.11 SEC
BH CV ECHO MEAS - PA PR(ACCEL): 30 MMHG
BH CV ECHO MEAS - RAP SYSTOLE: 10 MMHG
BH CV ECHO MEAS - RVSP: 23.2 MMHG
BH CV ECHO MEAS - SI(AO): 112.8 ML/M^2
BH CV ECHO MEAS - SI(CUBED): 21.7 ML/M^2
BH CV ECHO MEAS - SI(MOD-SP4): 10.2 ML/M^2
BH CV ECHO MEAS - SI(TEICH): 19.7 ML/M^2
BH CV ECHO MEAS - SV(AO): 240.3 ML
BH CV ECHO MEAS - SV(CUBED): 46.3 ML
BH CV ECHO MEAS - SV(MOD-SP4): 21.7 ML
BH CV ECHO MEAS - SV(TEICH): 42 ML
BH CV ECHO MEAS - TR MAX VEL: 182 CM/SEC
MAXIMAL PREDICTED HEART RATE: 164 BPM
STRESS TARGET HR: 139 BPM

## 2020-02-29 PROCEDURE — 99233 SBSQ HOSP IP/OBS HIGH 50: CPT | Performed by: PSYCHIATRY & NEUROLOGY

## 2020-02-29 RX ADMIN — LISINOPRIL 20 MG: 10 TABLET ORAL at 08:02

## 2020-02-29 RX ADMIN — METOPROLOL SUCCINATE 25 MG: 25 TABLET, EXTENDED RELEASE ORAL at 08:03

## 2020-02-29 RX ADMIN — TRAZODONE HYDROCHLORIDE 50 MG: 50 TABLET ORAL at 21:07

## 2020-02-29 RX ADMIN — OLANZAPINE 15 MG: 5 TABLET, ORALLY DISINTEGRATING ORAL at 21:07

## 2020-03-01 LAB — TESTOST FREE SERPL-MCNC: 11.3 PG/ML (ref 7.2–24)

## 2020-03-01 PROCEDURE — 99233 SBSQ HOSP IP/OBS HIGH 50: CPT | Performed by: PSYCHIATRY & NEUROLOGY

## 2020-03-01 RX ADMIN — TRAZODONE HYDROCHLORIDE 50 MG: 50 TABLET ORAL at 22:17

## 2020-03-01 RX ADMIN — LISINOPRIL 20 MG: 10 TABLET ORAL at 08:39

## 2020-03-01 RX ADMIN — OLANZAPINE 15 MG: 5 TABLET, ORALLY DISINTEGRATING ORAL at 15:00

## 2020-03-01 RX ADMIN — METOPROLOL SUCCINATE 25 MG: 25 TABLET, EXTENDED RELEASE ORAL at 08:39

## 2020-03-01 NOTE — PLAN OF CARE
PT RATES ANXIETY AND DEPRESSION 6/10 DENIES ANY SI, HI, OR AVH. PT ISOLATES TO HIS ROOM , CONTINUED TO ENCOURAGE HIM TO SPEND MORE TIME IN THE DAYROOM

## 2020-03-01 NOTE — PLAN OF CARE
Problem: Patient Care Overview  Goal: Plan of Care Review  Outcome: Ongoing (interventions implemented as appropriate)  Flowsheets  Taken 3/1/2020 0440  Progress: no change  Outcome Summary: Pt remains paranoid, isolates to room. Rates anxiety 6/10 depression 7/10. Denies SI LILIAN WEAVER.  Taken 2/29/2020 1945  Plan of Care Reviewed With: patient  Patient Agreement with Plan of Care: agrees

## 2020-03-02 PROCEDURE — 99233 SBSQ HOSP IP/OBS HIGH 50: CPT | Performed by: PSYCHIATRY & NEUROLOGY

## 2020-03-02 RX ORDER — OLANZAPINE 5 MG/1
20 TABLET, ORALLY DISINTEGRATING ORAL NIGHTLY
Status: DISCONTINUED | OUTPATIENT
Start: 2020-03-02 | End: 2020-03-03 | Stop reason: HOSPADM

## 2020-03-02 RX ADMIN — SERTRALINE 50 MG: 50 TABLET, FILM COATED ORAL at 16:19

## 2020-03-02 RX ADMIN — TRAZODONE HYDROCHLORIDE 50 MG: 50 TABLET ORAL at 21:06

## 2020-03-02 RX ADMIN — METOPROLOL SUCCINATE 25 MG: 25 TABLET, EXTENDED RELEASE ORAL at 09:02

## 2020-03-02 RX ADMIN — OLANZAPINE 20 MG: 5 TABLET, ORALLY DISINTEGRATING ORAL at 21:06

## 2020-03-02 RX ADMIN — LISINOPRIL 20 MG: 10 TABLET ORAL at 09:02

## 2020-03-02 NOTE — PLAN OF CARE
Problem: Patient Care Overview  Goal: Plan of Care Review  Outcome: Ongoing (interventions implemented as appropriate)  Flowsheets (Taken 3/2/2020 0164)  Plan of Care Reviewed With: patient  Patient Agreement with Plan of Care: agrees  Note:   Patient isolates in room Denies SI Hi and AVH Doesn't want to hurt himself or anyone else calm and cooperative

## 2020-03-02 NOTE — PLAN OF CARE
Problem: Patient Care Overview  Goal: Plan of Care Review  Outcome: Ongoing (interventions implemented as appropriate)  Flowsheets  Taken 3/2/2020 0214  Progress: no change  Outcome Summary: Pt continues to isolate to room. Rates anxiety6/10 depression 7/10. Denies SI HI AVH. Pt calm and cooperative.  Taken 3/1/2020 1935  Plan of Care Reviewed With: patient  Patient Agreement with Plan of Care: agrees

## 2020-03-02 NOTE — PLAN OF CARE
Problem: Patient Care Overview  Goal: Interprofessional Rounds/Family Conf  Outcome: Ongoing (interventions implemented as appropriate)  Flowsheets (Taken 2/26/2020 1245)  Participants: psychiatrist;nursing;social work;patient;milieu/psych techs;family  Summary: Treatment team staffing and patient evaluation       1130:     DATA:         Therapist met individually with patient this date. Patient agreeable. Continued to review aftercare arrangements and provided emotional support.  Patient receptive Staffed case with Dr. Matta this date.  Anticipate possible discharge tomorrow.  Patient continues to be paranoid about his room and being followed when he returns.  He refuses to consider other living arrangements like personal care home.      Clinical Maneuvering/Intervention:     Therapist discussed the importance of active participation, and honesty to the treatment process.  Encouraged the patient to discuss/vent their feelings, frustrations, and fears concerning their ongoing medical issues and validated their feelings.     Allowed patient to freely discuss issues without interruption or judgment. Provided safe, confidential environment to facilitate the development of positive therapeutic relationship and encourage open, honest communication.      Therapist addressed discharge safety planning this date. Assisted patient in identifying risk factors which would indicate the need for higher level of care after discharge;  including thoughts to harm self or others and/or self-harming behavior. Encouraged patient to call 911, or present to the nearest emergency room should any of these events occur. Discussed crisis intervention services and means to access.  Encouraged securing any objects of harm.      ASSESSMENT:      The patient was seen for follow up this date.  Patient noted to be pacing his room and appeared somewhat agitated.  Therapist reviewed that treatment team planned to keep patient in the hospital  today to monitor his paranoia and that he possibly can discharge tomorrow. Patient rates depression at 7/10, anxiety at 6/10. Patient denies SI/HI.       PLAN:       Patient to remain hospitalized this date.     Treatment team will focus efforts on stabilizing patient's acute symptoms while providing education on healthy coping and crisis management to reduce hospitalizations.   Patient requires daily psychiatrist evaluation and 24/7 nursing supervision to promote patient  safety.     Therapist will offer 1-4 individual sessions, 1 therapy group daily, family education, and appropriate referral.    Therapist recommends outpatient psych referral. [place] [facility] Patient to return [place] to [facility] and [facility].

## 2020-03-02 NOTE — PROGRESS NOTES
"INPATIENT PSYCHIATRIC PROGRESS NOTE    Name:  Jose Taylor  :  1965  MRN:  [id number]  Visit Number:  [id number]  Length of stay:  6    Behavioral Health Treatment Plan and Problem List: I have reviewed and approved the Behavioral Health Treatment Plan and Problem list.    SUBJECTIVE    CC/ Focus of exam: Paranoid schizophrenia    Patient's subjective status: \"Anxious, worried about neighbors\"    INTERVAL HISTORY:   Patient having significant anxiety today.  He stayed in bed and exhibited pressured speech, tangentiality and significant paranoia and anxiety related to mental health, his neighbors, being woken up at night, someone coming into his room and spinning around for him to see.  More bizarre and anxious today than I previously seen.  Will adjust meds today and reevaluate tomorrow, as patient says he would like to return home.    Previous history:  It seems that the patient probably will be compliant with oral medications alone, will shift to relying on Zyprexa as opposed to the Depo format aripiprazole.  Metabolic testing performed and reviewed.  Reviewed normal testosterone level.    Depression rating 3/10  Anxiety rating 8/10  Psychosis: Increase  Sleep: \"bad, got woke up a lot\"    Hepatitis panel negative, free T4 mildly decreased, TSH normal, testosterone normal     Review of Systems   Constitutional: Negative.    Respiratory: Negative.    Cardiovascular: Negative.    Gastrointestinal: Negative.    Musculoskeletal: Negative.    Neurological: Negative.    Psychiatric/Behavioral: Positive for hallucinations. Negative for dysphoric mood and sleep disturbance. The patient is nervous/anxious.         Worsening anxiety and paranoia today         OBJECTIVE    Temp:  [97.4 °F (36.3 °C)] 97.4 °F (36.3 °C)  Heart Rate:  [103-117] 117  Resp:  [18] 18  BP: (103-114)/(71-83) 103/71    MENTAL STATUS EXAM:      Appearance:Casually dressed, good hygeine.   Cooperation:Cooperative  Psychomotor: No psychomotor " agitation/retardation, No EPS, No motor tics  Speech-normal rate, amount.   Mood/Affect:  Inappropriate and Restricted  Thought Processes:  loose associations and disorganized  Thought Content:  paranoid, delusional   Hallucination(s): auditory but reports none for two days  Hopelessness: No  Optimistic:minimally  Suicidal Thoughts:   No  Suicidal Plan/Intent:  No  Homicidal Thoughts:  absent  Orientation: oriented x 3  Memory: Immediate, recent, recent remote, remote intact    Lab Results (last 24 hours)     Procedure Component Value Units Date/Time    Testosterone Free Direct [[id number]] Collected:  02/28/20 0741    Specimen:  Blood Updated:  03/01/20 1730     Testosterone, Free 11.3 pg/mL     Narrative:       Performed at:  [address] [place] [address]  [id number]  : Jose Art MD, Phone:  [phone]           Imaging Results (Last 24 Hours)     ** No results found for the last 24 hours. **           ECG/EMG Results (most recent)     Procedure Component Value Units Date/Time    ECG 12 Lead [[id number]] Collected:  02/26/20 1723     Updated:  02/28/20 0947    Narrative:       Test Reason : Baseline study  Blood Pressure : **/** mmHG  Vent. Rate : 093 BPM     Atrial Rate : 093 BPM     P-R Int : 220 ms          QRS Dur : 108 ms      QT Int : 364 ms       P-R-T Axes : 068 -26 043 degrees     QTc Int : 452 ms    Sinus rhythm with 1st degree AV block  Incomplete right bundle branch block  Cannot rule out Anterior infarct , age undetermined  Abnormal ECG  No previous ECGs available  Confirmed by Celina Lemos ([id number]) on 2/28/2020 9:47:08 AM    Referred By:  RADHA           Confirmed By:Celina Lemos    Adult Transthoracic Echo Complete W/ Cont if Necessary Per Protocol [[id number]] Collected:  02/29/20 0853     Updated:  02/29/20 1229     BSA 2.1 m^2      IVSd 1.6 cm      LVIDd 4.3 cm      LVIDs 3.2 cm      LVPWd 1.5 cm      IVS/LVPW 1.1     FS 26.0 %      EDV(Teich)  81.7 ml      ESV(Teich) 39.7 ml      EF(Teich) 51.4 %      EDV(cubed) 77.9 ml      ESV(cubed) 31.6 ml      EF(cubed) 59.5 %      LV mass(C)d 277.1 grams      LV mass(C)dI 130.1 grams/m^2      SV(Teich) 42.0 ml      SI(Teich) 19.7 ml/m^2      SV(cubed) 46.3 ml      SI(cubed) 21.7 ml/m^2      Ao root diam 4.1 cm      Ao root area 12.9 cm^2      ACS 2.5 cm      LA dimension 3.7 cm      LA/Ao 0.91     LVOT diam 2.6 cm      LVOT area 5.3 cm^2      LVOT area(traced) 5.3 cm^2      LVLd ap4 7.7 cm      EDV(MOD-sp4) 37.0 ml      LVLs ap4 7.7 cm      ESV(MOD-sp4) 15.3 ml      EF(MOD-sp4) 58.6 %      SV(MOD-sp4) 21.7 ml      SI(MOD-sp4) 10.2 ml/m^2      Ao root area (BSA corrected) 1.9     LV Gaston Vol (BSA corrected) 17.4 ml/m^2      LV Sys Vol (BSA corrected) 7.2 ml/m^2      MV E max madonna 57.8 cm/sec      MV A max madonna 66.0 cm/sec      MV E/A 0.88     Ao pk madonna 99.0 cm/sec      Ao max PG 3.9 mmHg      Ao V2 mean 76.5 cm/sec      Ao mean PG 2.5 mmHg      Ao V2 VTI 18.7 cm      SV(Ao) 240.3 ml      SI(Ao) 112.8 ml/m^2      PA acc time 0.11 sec      TR max madonna 182.0 cm/sec      RVSP(TR) 23.2 mmHg      RAP systole 10.0 mmHg      PA pr(Accel) 30.0 mmHg       CV ECHO TARAH - BZI_BMI 35.4 kilograms/m^2       CV ECHO TARAH - BSA(HAYCOCK) 2.2 m^2       CV ECHO TARAH - BZI_METRIC_WEIGHT 102.5 kg       CV ECHO TARAH - BZI_METRIC_HEIGHT 170.2 cm      Target HR (85%) 139 bpm      Max. Pred. HR (100%) 164 bpm     Narrative:       · Left ventricle not well visualized. Left ventricular systolic function   is normal. Estimated EF appears to be in the range of 56 - 60%  · Left ventricular diastolic dysfunction (grade I) consistent with   impaired relaxation.  · Left ventricular wall thickness is consistent with mild concentric   hypertrophy.  · There is no evidence of pericardial effusion.              ALLERGIES: Invega [paliperidone er] and Risperidone and related      Current Facility-Administered Medications:   •  aluminum-magnesium  hydroxide-simethicone (MAALOX MAX) 400-400-40 MG/5ML suspension 15 mL, 15 mL, Oral, Q6H PRN, Bal Garcia MD  •  benzonatate (TESSALON) capsule 100 mg, 100 mg, Oral, TID PRN, Bal Garcia MD  •  famotidine (PEPCID) tablet 20 mg, 20 mg, Oral, BID PRN, Bal Garcia MD  •  ibuprofen (ADVIL,MOTRIN) tablet 400 mg, 400 mg, Oral, Q6H PRN, Bal Garcia MD  •  lisinopril (PRINIVIL,ZESTRIL) tablet 20 mg, 20 mg, Oral, Daily, Bal Garcia MD, 20 mg at 03/02/20 0902  •  loperamide (IMODIUM) capsule 2 mg, 2 mg, Oral, Q2H PRN, Bal Garcia MD  •  magnesium hydroxide (MILK OF MAGNESIA) suspension 2400 mg/10mL 10 mL, 10 mL, Oral, Daily PRN, Bal Garcia MD  •  metoprolol succinate XL (TOPROL-XL) 24 hr tablet 25 mg, 25 mg, Oral, Q24H, SubramaniyamWero MD, 25 mg at 03/02/20 0902  •  nicotine (NICODERM CQ) 21 MG/24HR patch 1 patch, 1 patch, Transdermal, Q24H, Bal Garcia MD  •  OLANZapine zydis (zyPREXA) disintegrating tablet 20 mg, 20 mg, Oral, Nightly, Colin Matta MD  •  OLANZapine zydis (zyPREXA) disintegrating tablet 5 mg, 5 mg, Oral, TID PRN, Tye Garcia MD  •  ondansetron (ZOFRAN) tablet 4 mg, 4 mg, Oral, Q6H PRN, Bal Garcia MD  •  sertraline (ZOLOFT) tablet 50 mg, 50 mg, Oral, Daily, Colin Matta MD  •  sodium chloride nasal spray 2 spray, 2 spray, Each Nare, PRN, Bal Garcia MD  •  traZODone (DESYREL) tablet 50 mg, 50 mg, Oral, Nightly PRN, Bal Garcia MD, 50 mg at 03/01/20 3347    Chart, notes, vitals, labs and EKG personally reviewed.    ASSESSMENT & PLAN    • Schizophrenia Paranoid type,  (CMS/HCC)  Zyprexa , to provide for supportive psychotherapeutic effort. See HPI   F20.0       Schizophrenia  -Increase olanzapine to 20 mg nightly  -Metabolic labs reviewed.  Triglycerides and LDL mildly elevated.  Transaminases mildly elevated chronically  -Hepatitis panel negative  -TSH within normal limits, free T4 mildly low  -Patient  continues to report resolution of auditory hallucinations though he exhibits significantly more anxiety today than yesterday.  He continues to be paranoid and delusional about various topics.    Unspecified anxiety disorder -new problem  -Patient exhibiting worsening anxiety, largely triggered by paranoia and delusions  -Begin sertraline 50 mg daily    Hypertension  -Continue lisinopril  -Toprol 25 mg daily added by cardiology during this admission    Abnormal EKG  -EKG showed sinus rhythm with first-degree AV block and incomplete right bundle branch block with possible anterior infarct  -Cardiology consulted.  Appreciate involvement  -Echo performed and reviewed  -Cardiology recommends maintaining current medication regimen and following up in 4 weeks as outpatient      Special precautions: Special Precautions Level 3 (q15 min checks)     Behavioral Health Treatment Plan and Problem List: I have reviewed and approved the Behavioral Health Treatment Plan and Problem list.    Clinician:  Colin Matta MD  03/02/20  12:16 PM    Dictated utilizing Dragon dictation

## 2020-03-03 VITALS
HEART RATE: 101 BPM | BODY MASS INDEX: 35.5 KG/M2 | TEMPERATURE: 98.2 F | HEIGHT: 67 IN | OXYGEN SATURATION: 96 % | DIASTOLIC BLOOD PRESSURE: 92 MMHG | WEIGHT: 226.2 LBS | SYSTOLIC BLOOD PRESSURE: 126 MMHG | RESPIRATION RATE: 18 BRPM

## 2020-03-03 PROCEDURE — 99239 HOSP IP/OBS DSCHRG MGMT >30: CPT | Performed by: PSYCHIATRY & NEUROLOGY

## 2020-03-03 RX ORDER — OLANZAPINE 20 MG/1
20 TABLET ORAL NIGHTLY
Qty: 30 TABLET | Refills: 0 | Status: SHIPPED | OUTPATIENT
Start: 2020-03-03 | End: 2020-03-23 | Stop reason: SDUPTHER

## 2020-03-03 RX ORDER — METOPROLOL SUCCINATE 25 MG/1
25 TABLET, EXTENDED RELEASE ORAL
Qty: 30 TABLET | Refills: 0 | Status: SHIPPED | OUTPATIENT
Start: 2020-03-04 | End: 2020-03-23 | Stop reason: SDUPTHER

## 2020-03-03 RX ORDER — LISINOPRIL 20 MG/1
20 TABLET ORAL DAILY
Qty: 30 TABLET | Refills: 0 | Status: SHIPPED | OUTPATIENT
Start: 2020-03-03 | End: 2020-03-23 | Stop reason: SDUPTHER

## 2020-03-03 RX ORDER — TRAZODONE HYDROCHLORIDE 50 MG/1
50 TABLET ORAL NIGHTLY PRN
Qty: 30 TABLET | Refills: 0 | Status: ON HOLD | OUTPATIENT
Start: 2020-03-03 | End: 2020-05-06

## 2020-03-03 RX ADMIN — SERTRALINE 50 MG: 50 TABLET, FILM COATED ORAL at 08:19

## 2020-03-03 RX ADMIN — METOPROLOL SUCCINATE 25 MG: 25 TABLET, EXTENDED RELEASE ORAL at 08:18

## 2020-03-03 RX ADMIN — LISINOPRIL 20 MG: 10 TABLET ORAL at 08:18

## 2020-03-03 NOTE — PLAN OF CARE
Problem: Patient Care Overview  Goal: Plan of Care Review  Outcome: Ongoing (interventions implemented as appropriate)  Flowsheets  Taken 2/26/2020 1245 by Kendy Castro  Consent Given to Review Plan with: Sister Laura Sierra [phone]  Taken 3/3/2020 0222 by Lilia Downing, RN  Plan of Care Reviewed With: patient  Patient Agreement with Plan of Care: agrees  Taken 3/2/2020 0214 by Cristina Bran RN  Outcome Summary: Pt continues to isolate to room. Rates anxiety6/10 depression 7/10. Denies SI HI AVH. Pt calm and cooperative.

## 2020-03-03 NOTE — PLAN OF CARE
Problem: Patient Care Overview  Goal: Interprofessional Rounds/Family Conf  Outcome: Ongoing (interventions implemented as appropriate)  Flowsheets (Taken 2/26/2020 1245)  Participants: psychiatrist;nursing;social work;patient;milieu/psych techs;family  Summary: Treatment team staffing and patient evaluation    1115:     Therapist assisting with discharge planning. Patient denies SI/HI and acute symptoms.  Patient appears to be at his baseline. Patient reports that he likes to keep his mind busy and distracted as healthy coping. Sometimes he will walk.  Patient [facility] to [facility] and [place] [facility]  Patient's medication had copay; he requested medicines be sent to [facility].      Contacted patient's sister Laura at [phone]; no answer this date.  She was last contacted upon patient's admission and agreeable for him to return home at discharge. She denied safety concerns at that time.     Assisted patient in identifying risk factors which would indicate the need for higher level of care including thoughts to harm self or others and/or self-harming behavior and encouraged patient to call 911, or present to the nearest emergency room should any of these events occur. Discussed crisis intervention services and means to access.  Patient adamantly and convincingly denies current suicidal or homicidal ideation or perceptual disturbance.     Patient requests public transport home.

## 2020-03-08 NOTE — DISCHARGE SUMMARY
"      PSYCHIATRIC DISCHARGE SUMMARY     Patient Identification:  Name:  Jose Taylor  Age:  54 y.o.  Sex:  male  :  1965  MRN:  [id number]  Visit Number:  [id number]    Date of Admission:2020   Date of Discharge: 3/3/2020     Discharge Diagnosis:  Principal Problem:    Schizophrenia, paranoid type (CMS/HCC)  Active Problems:    Hypertension      Admission Diagnosis:  Psychosis (CMS/HCC) [F29]     Hospital Course  Patient is a 54 y.o. male presented with paranoia and psychosis.  Admitted for crisis stabilization.  Mild elevations of triglycerides, LDL and transaminases on admission labs but otherwise within normal limits.  Olanzapine titrated to 20 mg nightly with some improvement.  Significant anxiety prompted initiation of sertraline 50 mg daily which she tolerated.  Cardiology was consulted for abnormal EKG and hypertension and Toprol 25 mg daily was added.  Patient was still experiencing thoughts of paranoia about people listening in on him, but agitation and anxiety had improved, he was stable throughout his stay with no incidents of agitation or aggression.  He was not considered to be a danger to himself or others and requested discharge to return home.    On the day of discharge, patient denied SI, HI or AVH.  Patient showed improvement of presenting symptoms and was deemed appropriate for discharge today.    Mental Status Exam upon discharge:   Mood \" better\"   Affect-congruent, appropriate, stable  Thought Content-goal directed, some delusional material present  Thought process-linear, more organized.  Suicidality: No SI  Homicidality: No HI  Perception: No AH/VH    Procedures Performed         Consults:   Consults     Date and Time Order Name Status Description    2020 0912 Inpatient Cardiology Consult Completed           Pertinent Test Results:   Lab Results (last 7 days)     Procedure Component Value Units Date/Time    Testosterone Free Direct [[id number]] Collected:  20 0741    " Specimen:  Blood Updated:  03/01/20 1730     Testosterone, Free 11.3 pg/mL     Narrative:       Performed at:  [address] [facility]  [id number]  : Jose Art MD, Phone:  [phone]    Comprehensive Metabolic Panel [[id number]]  (Abnormal) Collected:  02/28/20 0741    Specimen:  Blood Updated:  02/28/20 0829     Glucose 159 mg/dL      BUN 19 mg/dL      Creatinine 0.88 mg/dL      Sodium 138 mmol/L      Potassium 3.6 mmol/L      Chloride 103 mmol/L      CO2 23.9 mmol/L      Calcium 8.9 mg/dL      Total Protein 7.4 g/dL      Albumin 3.63 g/dL      ALT (SGPT) 53 U/L      AST (SGOT) 44 U/L      Alkaline Phosphatase 63 U/L      Total Bilirubin 0.4 mg/dL      eGFR Non African Amer 90 mL/min/1.73      Globulin 3.8 gm/dL      A/G Ratio 1.0 g/dL      BUN/Creatinine Ratio 21.6     Anion Gap 11.1 mmol/L     Narrative:       GFR Normal >60  Chronic Kidney Disease <60  Kidney Failure <15      Lipid Panel [[id number]]  (Abnormal) Collected:  02/28/20 0741    Specimen:  Blood Updated:  02/28/20 0829     Total Cholesterol 189 mg/dL      Triglycerides 173 mg/dL      HDL Cholesterol 32 mg/dL      LDL Cholesterol  122 mg/dL      VLDL Cholesterol 34.6 mg/dL      LDL/HDL Ratio 3.83    Narrative:       Cholesterol Reference Ranges  (U.S. Department of Health and Human Services ATP III Classifications)    Desirable          <200 mg/dL  Borderline High    200-239 mg/dL  High Risk          >240 mg/dL      Triglyceride Reference Ranges  (U.S. Department of Health and Human Services ATP III Classifications)    Normal           <150 mg/dL  Borderline High  150-199 mg/dL  High             200-499 mg/dL  Very High        >500 mg/dL    HDL Reference Ranges  (U.S. Department of Health and Human Services ATP III Classifcations)    Low     <40 mg/dl (major risk factor for CHD)  High    >60 mg/dl ('negative' risk factor for CHD)        LDL Reference Ranges  (U.S. Department of Health and Human Services  ATP III Classifcations)    Optimal          <100 mg/dL  Near Optimal     100-129 mg/dL  Borderline High  130-159 mg/dL  High             160-189 mg/dL  Very High        >189 mg/dL    Hepatitis Panel, Acute [[id number]] Collected:  02/27/20 0830    Specimen:  Blood Updated:  02/27/20 2030    Narrative:       The following orders were created for panel order Hepatitis Panel, Acute.  Procedure                               Abnormality         Status                     ---------                               -----------         ------                     Hepatitis Panel, Acute[[id number]]       Normal              Final result               Hep B Confirmation Tube[[id number]]                          Final result                 Please view results for these tests on the individual orders.    Hep B Confirmation Tube [[id number]] Collected:  02/27/20 0830    Specimen:  Blood Updated:  02/27/20 2030     Extra Tube Hold for add-ons.     Comment: Auto resulted.       Hepatitis Panel, Acute [[id number]]  (Normal) Collected:  02/27/20 0830    Specimen:  Blood Updated:  02/27/20 0923     Hepatitis B Surface Ag Non-Reactive     Hep A IgM Non-Reactive     Hep B C IgM Non-Reactive     Hepatitis C Ab Non-Reactive    Narrative:       Results may be falsely decreased if patient taking Biotin.     Comprehensive Metabolic Panel [[id number]]  (Abnormal) Collected:  02/27/20 0830    Specimen:  Blood Updated:  02/27/20 0921     Glucose 125 mg/dL      BUN 21 mg/dL      Creatinine 0.97 mg/dL      Sodium 139 mmol/L      Potassium 3.7 mmol/L      Chloride 103 mmol/L      CO2 27.1 mmol/L      Calcium 8.9 mg/dL      Total Protein 7.0 g/dL      Albumin 3.37 g/dL      ALT (SGPT) 51 U/L      AST (SGOT) 42 U/L      Alkaline Phosphatase 60 U/L      Total Bilirubin 0.3 mg/dL      eGFR Non African Amer 80 mL/min/1.73      Globulin 3.6 gm/dL      A/G Ratio 0.9 g/dL      BUN/Creatinine Ratio 21.6     Anion Gap 8.9 mmol/L     Narrative:       GFR Normal  >60  Chronic Kidney Disease <60  Kidney Failure <15      T4, Free [[id number]]  (Abnormal) Collected:  02/27/20 0830    Specimen:  Blood Updated:  02/27/20 0920     Free T4 0.89 ng/dL     Narrative:       Results may be falsely increased if patient taking Biotin.      TSH [[id number]]  (Normal) Collected:  02/27/20 0830    Specimen:  Blood Updated:  02/27/20 0919     TSH 3.580 uIU/mL     Lipid Panel [[id number]]  (Abnormal) Collected:  02/27/20 0830    Specimen:  Blood Updated:  02/27/20 0915     Total Cholesterol 175 mg/dL      Triglycerides 178 mg/dL      HDL Cholesterol 30 mg/dL      LDL Cholesterol  109 mg/dL      VLDL Cholesterol 35.6 mg/dL      LDL/HDL Ratio 3.65    Narrative:       Cholesterol Reference Ranges  (U.S. Department of Health and Human Services ATP III Classifications)    Desirable          <200 mg/dL  Borderline High    200-239 mg/dL  High Risk          >240 mg/dL      Triglyceride Reference Ranges  (U.S. Department of Health and Human Services ATP III Classifications)    Normal           <150 mg/dL  Borderline High  150-199 mg/dL  High             200-499 mg/dL  Very High        >500 mg/dL    HDL Reference Ranges  (U.S. Department of Health and Human Services ATP III Classifcations)    Low     <40 mg/dl (major risk factor for CHD)  High    >60 mg/dl ('negative' risk factor for CHD)        LDL Reference Ranges  (U.S. Department of Health and Human Services ATP III Classifcations)    Optimal          <100 mg/dL  Near Optimal     100-129 mg/dL  Borderline High  130-159 mg/dL  High             160-189 mg/dL  Very High        >189 mg/dL    Hemoglobin A1c [[id number]]  (Normal) Collected:  02/27/20 0830    Specimen:  Blood Updated:  02/27/20 0908     Hemoglobin A1C 5.40 %     Narrative:       Hemoglobin A1C Ranges:    Increased Risk for Diabetes  5.7% to 6.4%  Diabetes                     >= 6.5%  Diabetic Goal                < 7.0%          Condition on Discharge:  improved    Vital Signs       Discharge  Disposition:  Home or Self Care    Discharge Medications:     Discharge Medications      New Medications      Instructions Start Date   metoprolol succinate XL 25 MG 24 hr tablet  Commonly known as:  TOPROL-XL   25 mg, Oral, Every 24 Hours Scheduled      OLANZapine 20 MG tablet  Commonly known as:  zyPREXA   20 mg, Oral, Nightly      sertraline 50 MG tablet  Commonly known as:  ZOLOFT   50 mg, Oral, Daily      traZODone 50 MG tablet  Commonly known as:  DESYREL   50 mg, Oral, Nightly PRN         Continue These Medications      Instructions Start Date   lisinopril 20 MG tablet  Commonly known as:  PRINIVIL,ZESTRIL   20 mg, Oral, Daily         Stop These Medications    amitriptyline 150 MG tablet  Commonly known as:  ELAVIL     busPIRone 15 MG tablet  Commonly known as:  BUSPAR     QUEtiapine 50 MG tablet  Commonly known as:  SEROquel            Discharge Diet: Normal  Diet Instructions     AS TOLERATED                 Activity at Discharge: Normal  Activity Instructions     AS TOLERATED                 Follow-up Appointments  Future Appointments   Date Time Provider Department Center   3/23/2020  2:00 PM Carmen Henao APRN [facility] None         Test Results Pending at Discharge  None     Clinician:   Colin Matta MD  03/08/20  2:57 PM

## 2020-03-23 ENCOUNTER — OFFICE VISIT (OUTPATIENT)
Dept: PSYCHIATRY | Facility: CLINIC | Age: 55
End: 2020-03-23

## 2020-03-23 VITALS — BODY MASS INDEX: 34.53 KG/M2 | HEIGHT: 67 IN | WEIGHT: 220 LBS

## 2020-03-23 DIAGNOSIS — F41.1 GENERALIZED ANXIETY DISORDER: ICD-10-CM

## 2020-03-23 DIAGNOSIS — F51.05 INSOMNIA DUE TO MENTAL CONDITION: ICD-10-CM

## 2020-03-23 DIAGNOSIS — I10 ESSENTIAL HYPERTENSION: ICD-10-CM

## 2020-03-23 DIAGNOSIS — F20.0 SCHIZOPHRENIA, PARANOID TYPE (HCC): Primary | ICD-10-CM

## 2020-03-23 PROCEDURE — 99214 OFFICE O/P EST MOD 30 MIN: CPT | Performed by: NURSE PRACTITIONER

## 2020-03-23 RX ORDER — OLANZAPINE 20 MG/1
20 TABLET ORAL NIGHTLY
Qty: 30 TABLET | Refills: 2 | Status: ON HOLD | OUTPATIENT
Start: 2020-03-23 | End: 2020-05-06

## 2020-03-23 RX ORDER — METOPROLOL SUCCINATE 25 MG/1
25 TABLET, EXTENDED RELEASE ORAL
Qty: 30 TABLET | Refills: 2 | Status: ON HOLD | OUTPATIENT
Start: 2020-03-23 | End: 2020-05-12 | Stop reason: SDUPTHER

## 2020-03-23 RX ORDER — LISINOPRIL 20 MG/1
20 TABLET ORAL DAILY
Qty: 30 TABLET | Refills: 2 | Status: ON HOLD | OUTPATIENT
Start: 2020-03-23 | End: 2020-05-12 | Stop reason: SDUPTHER

## 2020-03-23 RX ORDER — AMITRIPTYLINE HYDROCHLORIDE 75 MG/1
TABLET, FILM COATED ORAL
Qty: 60 TABLET | Refills: 2 | Status: ON HOLD | OUTPATIENT
Start: 2020-03-23 | End: 2020-05-06

## 2020-05-06 ENCOUNTER — HOSPITAL ENCOUNTER (INPATIENT)
Facility: HOSPITAL | Age: 55
LOS: 6 days | Discharge: HOME | End: 2020-05-12
Attending: PSYCHIATRY & NEUROLOGY | Admitting: PSYCHIATRY & NEUROLOGY

## 2020-05-06 ENCOUNTER — TELEPHONE (OUTPATIENT)
Dept: PSYCHIATRY | Facility: CLINIC | Age: 55
End: 2020-05-06

## 2020-05-06 ENCOUNTER — HOSPITAL ENCOUNTER (EMERGENCY)
Facility: HOSPITAL | Age: 55
Discharge: ANOTHER HOSPITAL | End: 2020-05-06
Attending: EMERGENCY MEDICINE | Admitting: EMERGENCY MEDICINE

## 2020-05-06 VITALS
RESPIRATION RATE: 18 BRPM | WEIGHT: 220 LBS | OXYGEN SATURATION: 93 % | SYSTOLIC BLOOD PRESSURE: 113 MMHG | TEMPERATURE: 99 F | HEIGHT: 67 IN | DIASTOLIC BLOOD PRESSURE: 83 MMHG | BODY MASS INDEX: 34.53 KG/M2 | HEART RATE: 105 BPM

## 2020-05-06 DIAGNOSIS — I10 ESSENTIAL HYPERTENSION: ICD-10-CM

## 2020-05-06 DIAGNOSIS — R44.3 HALLUCINATIONS: Primary | ICD-10-CM

## 2020-05-06 DIAGNOSIS — R45.850 HOMICIDAL IDEATIONS: ICD-10-CM

## 2020-05-06 PROBLEM — F29 PSYCHOSIS: Status: ACTIVE | Noted: 2020-05-06

## 2020-05-06 LAB
ALBUMIN SERPL-MCNC: 3.8 G/DL (ref 3.5–5.2)
ALBUMIN/GLOB SERPL: 1.1 G/DL
ALP SERPL-CCNC: 67 U/L (ref 39–117)
ALT SERPL W P-5'-P-CCNC: 38 U/L (ref 1–41)
AMPHET+METHAMPHET UR QL: NEGATIVE
AMPHETAMINES UR QL: NEGATIVE
ANION GAP SERPL CALCULATED.3IONS-SCNC: 13.4 MMOL/L (ref 5–15)
AST SERPL-CCNC: 45 U/L (ref 1–40)
BARBITURATES UR QL SCN: NEGATIVE
BASOPHILS # BLD AUTO: 0.04 10*3/MM3 (ref 0–0.2)
BASOPHILS NFR BLD AUTO: 0.4 % (ref 0–1.5)
BENZODIAZ UR QL SCN: NEGATIVE
BILIRUB SERPL-MCNC: 0.3 MG/DL (ref 0.2–1.2)
BILIRUB UR QL STRIP: NEGATIVE
BUN BLD-MCNC: 25 MG/DL (ref 6–20)
BUN/CREAT SERPL: 26.3 (ref 7–25)
BUPRENORPHINE SERPL-MCNC: NEGATIVE NG/ML
CALCIUM SPEC-SCNC: 9 MG/DL (ref 8.6–10.5)
CANNABINOIDS SERPL QL: NEGATIVE
CHLORIDE SERPL-SCNC: 103 MMOL/L (ref 98–107)
CLARITY UR: CLEAR
CO2 SERPL-SCNC: 23.6 MMOL/L (ref 22–29)
COCAINE UR QL: NEGATIVE
COLOR UR: YELLOW
CREAT BLD-MCNC: 0.95 MG/DL (ref 0.76–1.27)
DEPRECATED RDW RBC AUTO: 40.8 FL (ref 37–54)
EOSINOPHIL # BLD AUTO: 0.16 10*3/MM3 (ref 0–0.4)
EOSINOPHIL NFR BLD AUTO: 1.7 % (ref 0.3–6.2)
ERYTHROCYTE [DISTWIDTH] IN BLOOD BY AUTOMATED COUNT: 12.2 % (ref 12.3–15.4)
ETHANOL BLD-MCNC: <10 MG/DL (ref 0–10)
ETHANOL UR QL: <0.01 %
GFR SERPL CREATININE-BSD FRML MDRD: 82 ML/MIN/1.73
GLOBULIN UR ELPH-MCNC: 3.6 GM/DL
GLUCOSE BLD-MCNC: 100 MG/DL (ref 65–99)
GLUCOSE UR STRIP-MCNC: NEGATIVE MG/DL
HCT VFR BLD AUTO: 44.3 % (ref 37.5–51)
HGB BLD-MCNC: 15 G/DL (ref 13–17.7)
HGB UR QL STRIP.AUTO: NEGATIVE
IMM GRANULOCYTES # BLD AUTO: 0.04 10*3/MM3 (ref 0–0.05)
IMM GRANULOCYTES NFR BLD AUTO: 0.4 % (ref 0–0.5)
KETONES UR QL STRIP: NEGATIVE
LEUKOCYTE ESTERASE UR QL STRIP.AUTO: NEGATIVE
LYMPHOCYTES # BLD AUTO: 2.55 10*3/MM3 (ref 0.7–3.1)
LYMPHOCYTES NFR BLD AUTO: 27.4 % (ref 19.6–45.3)
MAGNESIUM SERPL-MCNC: 2 MG/DL (ref 1.6–2.6)
MCH RBC QN AUTO: 30.7 PG (ref 26.6–33)
MCHC RBC AUTO-ENTMCNC: 33.9 G/DL (ref 31.5–35.7)
MCV RBC AUTO: 90.8 FL (ref 79–97)
METHADONE UR QL SCN: NEGATIVE
MONOCYTES # BLD AUTO: 0.96 10*3/MM3 (ref 0.1–0.9)
MONOCYTES NFR BLD AUTO: 10.3 % (ref 5–12)
NEUTROPHILS # BLD AUTO: 5.56 10*3/MM3 (ref 1.7–7)
NEUTROPHILS NFR BLD AUTO: 59.8 % (ref 42.7–76)
NITRITE UR QL STRIP: NEGATIVE
NRBC BLD AUTO-RTO: 0 /100 WBC (ref 0–0.2)
OPIATES UR QL: NEGATIVE
OXYCODONE UR QL SCN: NEGATIVE
PCP UR QL SCN: NEGATIVE
PH UR STRIP.AUTO: 5.5 [PH] (ref 5–8)
PLATELET # BLD AUTO: 279 10*3/MM3 (ref 140–450)
PMV BLD AUTO: 8.8 FL (ref 6–12)
POTASSIUM BLD-SCNC: 4 MMOL/L (ref 3.5–5.2)
PROPOXYPH UR QL: NEGATIVE
PROT SERPL-MCNC: 7.4 G/DL (ref 6–8.5)
PROT UR QL STRIP: NEGATIVE
RBC # BLD AUTO: 4.88 10*6/MM3 (ref 4.14–5.8)
SODIUM BLD-SCNC: 140 MMOL/L (ref 136–145)
SP GR UR STRIP: 1.02 (ref 1–1.03)
TRICYCLICS UR QL SCN: POSITIVE
UROBILINOGEN UR QL STRIP: NORMAL
WBC NRBC COR # BLD: 9.31 10*3/MM3 (ref 3.4–10.8)

## 2020-05-06 PROCEDURE — 99284 EMERGENCY DEPT VISIT MOD MDM: CPT

## 2020-05-06 PROCEDURE — 80053 COMPREHEN METABOLIC PANEL: CPT | Performed by: EMERGENCY MEDICINE

## 2020-05-06 PROCEDURE — 80307 DRUG TEST PRSMV CHEM ANLYZR: CPT | Performed by: EMERGENCY MEDICINE

## 2020-05-06 PROCEDURE — 93005 ELECTROCARDIOGRAM TRACING: CPT | Performed by: EMERGENCY MEDICINE

## 2020-05-06 PROCEDURE — 83735 ASSAY OF MAGNESIUM: CPT | Performed by: EMERGENCY MEDICINE

## 2020-05-06 PROCEDURE — 85025 COMPLETE CBC W/AUTO DIFF WBC: CPT | Performed by: EMERGENCY MEDICINE

## 2020-05-06 PROCEDURE — 81003 URINALYSIS AUTO W/O SCOPE: CPT | Performed by: EMERGENCY MEDICINE

## 2020-05-06 RX ORDER — IBUPROFEN 400 MG/1
400 TABLET ORAL EVERY 6 HOURS PRN
Status: DISCONTINUED | OUTPATIENT
Start: 2020-05-06 | End: 2020-05-12 | Stop reason: HOSPADM

## 2020-05-06 RX ORDER — LISINOPRIL 10 MG/1
20 TABLET ORAL DAILY
Status: DISCONTINUED | OUTPATIENT
Start: 2020-05-07 | End: 2020-05-12 | Stop reason: HOSPADM

## 2020-05-06 RX ORDER — ACETAMINOPHEN 325 MG/1
650 TABLET ORAL EVERY 6 HOURS PRN
Status: DISCONTINUED | OUTPATIENT
Start: 2020-05-06 | End: 2020-05-07

## 2020-05-06 RX ORDER — BENZTROPINE MESYLATE 1 MG/ML
1 INJECTION INTRAMUSCULAR; INTRAVENOUS ONCE AS NEEDED
Status: DISCONTINUED | OUTPATIENT
Start: 2020-05-06 | End: 2020-05-07

## 2020-05-06 RX ORDER — ALUMINA, MAGNESIA, AND SIMETHICONE 2400; 2400; 240 MG/30ML; MG/30ML; MG/30ML
15 SUSPENSION ORAL EVERY 6 HOURS PRN
Status: DISCONTINUED | OUTPATIENT
Start: 2020-05-06 | End: 2020-05-12 | Stop reason: HOSPADM

## 2020-05-06 RX ORDER — BENZTROPINE MESYLATE 1 MG/1
2 TABLET ORAL ONCE AS NEEDED
Status: DISCONTINUED | OUTPATIENT
Start: 2020-05-06 | End: 2020-05-07

## 2020-05-06 RX ORDER — BENZONATATE 100 MG/1
100 CAPSULE ORAL 3 TIMES DAILY PRN
Status: DISCONTINUED | OUTPATIENT
Start: 2020-05-06 | End: 2020-05-12 | Stop reason: HOSPADM

## 2020-05-06 RX ORDER — TRAZODONE HYDROCHLORIDE 50 MG/1
50 TABLET ORAL NIGHTLY PRN
Status: DISCONTINUED | OUTPATIENT
Start: 2020-05-06 | End: 2020-05-12 | Stop reason: HOSPADM

## 2020-05-06 RX ORDER — AMITRIPTYLINE HYDROCHLORIDE 75 MG/1
150 TABLET, FILM COATED ORAL NIGHTLY
COMMUNITY
End: 2020-05-12 | Stop reason: HOSPADM

## 2020-05-06 RX ORDER — LOPERAMIDE HYDROCHLORIDE 2 MG/1
2 CAPSULE ORAL
Status: DISCONTINUED | OUTPATIENT
Start: 2020-05-06 | End: 2020-05-12 | Stop reason: HOSPADM

## 2020-05-06 RX ORDER — ONDANSETRON 4 MG/1
4 TABLET, FILM COATED ORAL EVERY 6 HOURS PRN
Status: DISCONTINUED | OUTPATIENT
Start: 2020-05-06 | End: 2020-05-12 | Stop reason: HOSPADM

## 2020-05-06 RX ORDER — METOPROLOL SUCCINATE 25 MG/1
25 TABLET, EXTENDED RELEASE ORAL
Status: DISCONTINUED | OUTPATIENT
Start: 2020-05-07 | End: 2020-05-12 | Stop reason: HOSPADM

## 2020-05-06 RX ORDER — OLANZAPINE 20 MG/1
20 TABLET ORAL DAILY
Status: ON HOLD | COMMUNITY
End: 2020-05-12 | Stop reason: SDUPTHER

## 2020-05-06 RX ORDER — ECHINACEA PURPUREA EXTRACT 125 MG
2 TABLET ORAL AS NEEDED
Status: DISCONTINUED | OUTPATIENT
Start: 2020-05-06 | End: 2020-05-12 | Stop reason: HOSPADM

## 2020-05-06 RX ORDER — OLANZAPINE 10 MG/1
20 TABLET ORAL DAILY
Status: DISCONTINUED | OUTPATIENT
Start: 2020-05-07 | End: 2020-05-12 | Stop reason: HOSPADM

## 2020-05-06 RX ORDER — FAMOTIDINE 20 MG/1
20 TABLET, FILM COATED ORAL 2 TIMES DAILY PRN
Status: DISCONTINUED | OUTPATIENT
Start: 2020-05-06 | End: 2020-05-12 | Stop reason: HOSPADM

## 2020-05-06 RX ORDER — HYDROXYZINE 50 MG/1
50 TABLET, FILM COATED ORAL EVERY 6 HOURS PRN
Status: DISCONTINUED | OUTPATIENT
Start: 2020-05-06 | End: 2020-05-12 | Stop reason: HOSPADM

## 2020-05-06 RX ORDER — AMITRIPTYLINE HYDROCHLORIDE 50 MG/1
150 TABLET, FILM COATED ORAL NIGHTLY
Status: DISCONTINUED | OUTPATIENT
Start: 2020-05-07 | End: 2020-05-07

## 2020-05-06 NOTE — TELEPHONE ENCOUNTER
Gave message to patient, if opening for sooner apt, will contact him. He understood that if thoughts continue to present to ER and said he would.

## 2020-05-06 NOTE — TELEPHONE ENCOUNTER
Called patient back per Carmen at 1:15 pm, and worked in tomorrow at 12:30 pm for a phone call. Patient agreed and understood. Was appreciative

## 2020-05-06 NOTE — ED NOTES
Spoke with [facility]. She states she will go speak with patient.      Jillian Adams, RN  05/06/20 9397

## 2020-05-06 NOTE — ED PROVIDER NOTES
TRIAGE CHIEF COMPLAINT:     Nursing and triage notes reviewed    Chief Complaint   Patient presents with   • Hallucinations   • Homicidal      HPI: Jose Taylor is a 54 y.o. male who presents to the emergency department complaining of hallucinations and homicidal ideation.  Patient states he hears people banging on his walls constantly and is unable to sleep because of this.  He states that he is starting to have violent thoughts when this happens.  He states that he has had this issue before and had to be hospitalized.  He states this helped him a lot last time.  He denies suicidal thoughts.  He states he is growing very concerned by the violent thoughts that he is having.    REVIEW OF SYSTEMS: All other systems reviewed and are negative     PAST MEDICAL HISTORY:   Past Medical History:   Diagnosis Date   • Anxiety    • Chronic pain disorder    • Delusional disorder (CMS/HCC)    • Depression    • Head injury    • Hormone disorder    • Hypertension    • Liver disease    • Low back pain    • Paranoid schizophrenia (CMS/HCC)    • Psychosis (CMS/HCC)         FAMILY HISTORY:   Family History   Problem Relation Age of Onset   • Heart disease Mother    • Dementia Father         SOCIAL HISTORY:   Social History     Socioeconomic History   • Marital status: Single     Spouse name: Not on file   • Number of children: Not on file   • Years of education: Not on file   • Highest education level: Not on file   Tobacco Use   • Smoking status: Never Smoker   • Smokeless tobacco: Never Used   Substance and Sexual Activity   • Alcohol use: No   • Drug use: No     Comment: h/o opiate dependence    • Sexual activity: Not Currently        SURGICAL HISTORY:   Past Surgical History:   Procedure Laterality Date   • BREAST SURGERY     • EXTERNAL EAR SURGERY     • HAIR TRANSPLANT     • MANDIBLE SURGERY     • NOSE SURGERY     • REPLACEMENT TOTAL KNEE     • SHOULDER SURGERY     • TONSILLECTOMY          CURRENT MEDICATIONS:      Medication  List      ASK your doctor about these medications    amitriptyline 75 MG tablet  Commonly known as:  ELAVIL  Take 2 tablets PO qhs     lisinopril 20 MG tablet  Commonly known as:  PRINIVIL,ZESTRIL  Take 1 tablet by mouth Daily. Indications: High Blood Pressure Disorder     metoprolol succinate XL 25 MG 24 hr tablet  Commonly known as:  TOPROL-XL  Take 1 tablet by mouth Daily. Indications: High Blood Pressure Disorder     OLANZapine 20 MG tablet  Commonly known as:  zyPREXA  Take 1 tablet by mouth Every Night. Indications: Schizophrenia     sertraline 50 MG tablet  Commonly known as:  ZOLOFT  Take 1.5 tablets PO daily  Indications: Generalized Anxiety Disorder     traZODone 50 MG tablet  Commonly known as:  DESYREL  Take 1 tablet by mouth At Night As Needed for Sleep. Indications: Trouble   Sleeping           ALLERGIES: Invega [paliperidone er] and Risperidone and related     PHYSICAL EXAM:   VITAL SIGNS:   Vitals:    05/06/20 1915   BP: 119/89   Pulse: 105   Resp: 18   Temp:    SpO2: 94%      CONSTITUTIONAL: Awake, oriented, appears non-toxic but anxious  HENT: Atraumatic, normocephalic, oral mucosa pink and moist, airway patent. Nares patent without drainage. External ears normal.   EYES: Conjunctiva clear  NECK: Trachea midline  CARDIOVASCULAR: Normal heart rate, Normal rhythm, No murmurs, rubs, gallops   PULMONARY/CHEST: Clear to auscultation, no rhonchi, wheezes, or rales. Symmetrical breath sounds.  ABDOMINAL: Non-distended, soft, non-tender - no rebound or guarding.  NEUROLOGIC: Non-focal, moving all four extremities, no gross sensory or motor deficits.   EXTREMITIES: No clubbing, cyanosis, or edema   SKIN: Warm, Dry, No erythema, No rash     ED COURSE / MEDICAL DECISION MAKING:   Jose Taylor is a 54 y.o. male who presents to the emergency department for evaluation of apparent hallucinations and homicidal thoughts.  Patient is not violent on arrival and is very appropriate during the interview, however does  appear very anxious about his current situation.  Patient seems genuinely distressed that he is having violent thoughts.  We will obtain laboratory tests for medical clearance.  Patient states he has had this issue in the past and hospitalization has helped him.    EKG interpreted by me reveals sinus rhythm with rate of 113 bpm.  There are some nonspecific ST and T wave changes.  This is an atypical appearing EKG.    Laboratory testing is largely unremarkable.  Patient medically clear for behavioral health evaluation.    While awaiting final disposition the care the patient was checked out to the oncoming physician.  Disposition will depend on the behavioral health evaluation.    DECISION TO DISCHARGE/ADMIT: see ED care timeline     FINAL IMPRESSION:   1 --hallucinations  2 -- homicidal ideation  3 --     Electronically signed by: Bhavana Rausch MD, 5/6/2020  19:12    19:35  I received care from Dr. Rausch, awaiting behavioral health plan. Patient will be accepted to [facility], under the care of Dr. Garcia.      Bhavana Rausch MD  05/06/20 1912       Tray Persaud DO  05/06/20 1936
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
## 2020-05-06 NOTE — TELEPHONE ENCOUNTER
Jose called in to report that since starting the zyprexa, he is having violent thoughts. He said this is not like him at all. He gets upset if he sees something on tv, or if there is noise next door. He was prescribed the med by his last hospital stay, he said, but doesn't want to continue the violent thoughts.please advise.

## 2020-05-07 PROCEDURE — 99223 1ST HOSP IP/OBS HIGH 75: CPT | Performed by: PSYCHIATRY & NEUROLOGY

## 2020-05-07 RX ADMIN — SERTRALINE 75 MG: 50 TABLET, FILM COATED ORAL at 08:36

## 2020-05-07 RX ADMIN — METOPROLOL SUCCINATE 25 MG: 25 TABLET, EXTENDED RELEASE ORAL at 08:35

## 2020-05-07 RX ADMIN — CARIPRAZINE 1.5 MG: 1.5 CAPSULE, GELATIN COATED ORAL at 12:33

## 2020-05-07 RX ADMIN — OLANZAPINE 20 MG: 10 TABLET, FILM COATED ORAL at 08:37

## 2020-05-07 RX ADMIN — AMITRIPTYLINE HYDROCHLORIDE 75 MG: 25 TABLET, FILM COATED ORAL at 21:22

## 2020-05-07 RX ADMIN — LISINOPRIL 20 MG: 10 TABLET ORAL at 08:35

## 2020-05-07 RX ADMIN — AMITRIPTYLINE HYDROCHLORIDE 150 MG: 50 TABLET, FILM COATED ORAL at 00:39

## 2020-05-07 NOTE — H&P
"INITIAL PSYCHIATRIC HISTORY & PHYSICAL    Patient Identification:  Name:   Jose Taylor  Age:   54 y.o.  Sex:   male  :   1965  MRN:   [id number]  Visit Number:   [id number]  Primary Care Physician:   Tray Garvey MD    SUBJECTIVE    CC/Focus of Exam: Psychosis    HPI: This is the second [facility] admission for Jose Taylor who is a 54 y.o. single never , , college-educated male who was admitted on 2020 with complaints of worsening paranoia where he hears people in the most he will that he lives in banging on tables, banging on the walls, controlling his television, and controlling his brain.  He reports that the housekeepers at the motel where he lives \"are in on it\", he states that they are using machines to control his mind and when he has a thought they start banging.  Patient states he was doing good when he left the hospital in February, but he has been getting worse for the past several days.  He states he began having violent thoughts regarding these banging noises, and decided to come to the hospital before he acted on them.  He did not specify of violent thoughts towards any particular person.  He reports he has been taking his medication every day, but does not think it is working.  States he has been feeling depressed, and has had thoughts of being better off dead but denies a specific plan he states these thoughts \"are scaring me\".  Reports having paranoia specifically relating to the  being involved in reading his brain.  He states this is a threat to him and his mind.  Patient reports \"I will do what ever you want me to do to get better\".    Available medical/psychiatric records reviewed and incorporated into the current document.     PAST PSYCHIATRIC HX: Patient reports a history of psychiatric treatment since his 30's.  Patient reports that he started having more issues with paranoia 6 years ago. Patient reports history of 3 other psychiatric " hospitalizations. He reports two pervious admissions to [facility] and one previous admission to [facility].  History of outpatient treatment at [facility] and [facility]. Most recently, patient currently attending outpatient psychiatric treatment at [facility] and sees JUAN Henao.  Patient has been diagnosed with Schizophrenia, paranoid type and Generalized Anxiety Disorder.     SUBSTANCE USE HX: Patient denies current use.  States he did abuse opiates and steroids in the 80s and 90s.  UDS negative for controlled substances.  UDS positive for tricyclic antidepressant in which he is prescribed.       FAMILY HX: Father has a history of paranoia and dementia, and had at least 1 hospitalization at [facility].  Mother and sister have a history of panic attacks.    SOCIAL HX: Patient was born and raised in [place], [place] by his biological parents.  Denies any abuse history.  Parents  at age 10 and he lived mainly with his father.  He was in the [employer] from [employer]20-0118 and received an honorable discharge.  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  Patient received 10-12 hair transplants, and also used steroids in the 80s and 90s because he struggled with self-esteem.  Patient's sister, Laura, is his payee and he has a good relationship with her.  Patient currently lives in a [facility], in [place], [place] for the past 2 years.  Patient states he did have some legal history surrounding his drug use in the 80s and 90s, but nothing since that time.    Past Medical History:   Diagnosis Date   • Anxiety    • Chronic pain disorder    • Delusional disorder (CMS/HCC)    • Depression    • Head injury    • Hormone disorder    • Hypertension    • Liver disease    • Low back pain    • Paranoid schizophrenia (CMS/HCC)    • Psychosis (CMS/HCC)        Past Surgical History:   Procedure Laterality Date   • BREAST SURGERY     • EXTERNAL  EAR SURGERY     • HAIR TRANSPLANT     • MANDIBLE SURGERY     • NOSE SURGERY     • REPLACEMENT TOTAL KNEE     • SHOULDER SURGERY     • TONSILLECTOMY         Family History   Problem Relation Age of Onset   • Heart disease Mother    • Dementia Father          Medications Prior to Admission   Medication Sig Dispense Refill Last Dose   • amitriptyline (ELAVIL) 75 MG tablet Take 150 mg by mouth Every Night.   5/5/2020 at Unknown time   • lisinopril (PRINIVIL,ZESTRIL) 20 MG tablet Take 1 tablet by mouth Daily. Indications: High Blood Pressure Disorder 30 tablet 2 5/6/2020 at 0900   • metoprolol succinate XL (TOPROL-XL) 25 MG 24 hr tablet Take 1 tablet by mouth Daily. Indications: High Blood Pressure Disorder 30 tablet 2 5/6/2020 at 0900   • OLANZapine (ZyPREXA) 20 MG tablet Take 20 mg by mouth Daily.   5/6/2020 at 0900   • sertraline (ZOLOFT) 50 MG tablet Take 75 mg by mouth Daily.   5/6/2020 at 0900           ALLERGIES:  Invega [paliperidone er] and Risperidone and related    Temp:  [97.6 °F (36.4 °C)-99 °F (37.2 °C)] 98 °F (36.7 °C)  Heart Rate:  [] 66  Resp:  [18] 18  BP: (108-147)/(70-89) 108/70    REVIEW OF SYSTEMS:  Review of Systems   Constitutional: Positive for unexpected weight change.   HENT: Negative.    Eyes: Negative.    Respiratory: Negative.    Cardiovascular: Negative.    Gastrointestinal: Negative.    Endocrine: Negative.    Genitourinary: Negative.    Musculoskeletal: Negative.    Skin: Negative.    Allergic/Immunologic: Negative.    Neurological: Negative.    Hematological: Negative.    Psychiatric/Behavioral: Positive for hallucinations and suicidal ideas. The patient is nervous/anxious.       See HPI for psychiatric ROS  OBJECTIVE    PHYSICAL EXAM:  This physical exam has been personally performed remotely in the unit aided by real-time audio/visual communication tools. Ang Orozco RN present during this exam and assisted during exam .     Physical Exam:  General: Patient appears awake, alert, and  in no acute distress.  Head: Normocephalic, atraumatic  Eyes: EOMI. Conjunctivae and sclerae normal.  Ears: Ears appear intact with no abnormalities noted.   Neck: Trachea midline. No obvious JVD.  Heart: Vital signs  Reviewed, to obtain EKG  Lungs: Respirations appear to be regular, even and unlabored with no signs of respiratory distress. No audible wheezing.  Abdomen: No obvious abdominal distension.  MS: Muscle tone appears normal. No gross deformities.  Extremities: No clubbing, cyanosis or edema noted.  Skin: No visible bleeding, bruising, or rash.  Neurologic: Alert and oriented x3. No gross focal deficits.     MENTAL STATUS EXAM:               Hygiene:   fair  Cooperation:  Cooperative  Eye Contact:  Fair  Psychomotor Behavior:  Appropriate  Affect:  Blunted  Hopelessness: 3  Speech:  Normal  Thought Progress:  Linear  Thought Content:  Bizarre  Suicidal:  Death wish  Homicidal:  Patient was having violent thoughts about hurting people, but no one specific  Hallucinations:  Auditory  Delusion:  Paranoid  Memory:  Intact  Orientation:  Person, Place, Time and Situation  Reliability:  fair  Insight:  Fair  Judgement:  Fair  Impulse Control:  Fair      Imaging Results (Last 24 Hours)     ** No results found for the last 24 hours. **           ECG/EMG Results (most recent)     None           Lab Results   Component Value Date    GLUCOSE 100 (H) 05/06/2020    BUN 25 (H) 05/06/2020    CREATININE 0.95 05/06/2020    EGFRIFNONA 82 05/06/2020    BCR 26.3 (H) 05/06/2020    CO2 23.6 05/06/2020    CALCIUM 9.0 05/06/2020    ALBUMIN 3.80 05/06/2020    AST 45 (H) 05/06/2020    ALT 38 05/06/2020       Lab Results   Component Value Date    WBC 9.31 05/06/2020    HGB 15.0 05/06/2020    HCT 44.3 05/06/2020    MCV 90.8 05/06/2020     05/06/2020       Pain Management Panel     Pain Management Panel Latest Ref Rng & Units 5/6/2020 2/25/2020    AMPHETAMINES SCREEN, URINE Negative Negative Negative    BARBITURATES SCREEN  Negative Negative Negative    BENZODIAZEPINE SCREEN, URINE Negative Negative Negative    BUPRENORPHINEUR Negative Negative Negative    COCAINE SCREEN, URINE Negative Negative Negative    METHADONE SCREEN, URINE Negative Negative Negative    METHAMPHETAMINEUR Negative Negative Negative          Brief Urine Lab Results  (Last result in the past 365 days)      Color   Clarity   Blood   Leuk Est   Nitrite   Protein   CREAT   Urine HCG        05/06/20 1855 Yellow Clear Negative Negative Negative Negative               Reviewed labs and studies done with this admission.       ASSESSMENT & PLAN:        Schizophrenia, paranoid type (CMS/HCC)  We will initially increase the dose of Zyprexa adding in Cariprazine, to check metabolic parameters, and the individual and group psychotherapeutic effort.   Other depressive disorder recurrent, severe  Will be attempting to taper off the amitriptyline increasing the Zoloft plus the psychotherapeutic effort    Hypertension  Lisinopril, metoprolol        The patient has been admitted for safety and stabilization.  Patient will be monitored for suicidality daily and maintained on Special Precautions Level 3 (q15 min checks) . The patient will have individual and group therapy with a master's level therapist. A master treatment plan will be developed and agreed upon by the patient and his/her treatment team.  The patient's estimated length of stay in the hospital is 5-7 days.       Written by Ang Urena RN, acting as scribe for Dr. Garcia. Dr. Garcia's signature on this note affirms that the note adequately documents the care provided.     Cy Urena RN  05/07/20  10:57 AM    I personally performed the services described in this note as scribed in my presence, and the note is both complete and accurate. I spent a total of 70 minutes in direct patient care including 40 minutes face to face with the patient  for assessment, coordination of care, and counseling in regards to the  current and follow-up treatment plans as relates to the patient's psychosis and depression. Answered patient questions regarding the treatment plan..    SANDRO Garcia MD

## 2020-05-07 NOTE — PLAN OF CARE
Problem: Patient Care Overview  Goal: Plan of Care Review  Outcome: Ongoing (interventions implemented as appropriate)  Flowsheets (Taken 5/7/2020 1452)  Consent Given to Review Plan with: Sister Laura  Progress: no change  Plan of Care Reviewed With: patient  Patient Agreement with Plan of Care: agrees  Outcome Summary: Therapist met individually with patient this date.  Completed social history and integrated summary  Goal: Individualization and Mutuality  Outcome: Ongoing (interventions implemented as appropriate)  Flowsheets  Taken 5/7/2020 1346  Patient Personal Strengths: expressive of emotions;expressive of needs;family/social support;resourceful;spiritual/Tenriism support;realistic evaluation of current/future capabilities;motivated for treatment;motivated for recovery;positive vocational history;positive educational history  Patient Vulnerabilities: Ineffective coping   Taken 5/7/2020 8074  Patient Specific Goals (Include Timeframe): Patient will deny SI/HI prior to discharge.  Patient will identify 1 healthy coping skill for depression prior to discharge.  Patient will consent to appropriate aftecare plan prior to discharge  How to Address Anxieties/Fears: Patient agreeable to talk to staff as needed  Patient Specific Interventions: Therapist will offer 1-4 individual sessions, family education, aftercare planning.  What Information Would Help Us Give You More Personalized Care?: None reported  How Would You and/or Your Support Person Like to Participate in Your Care?: Patient signed consent for his sister Laura to be involved via telephone  What Anxieties, Fears, Concerns, or Questions Do You Have About Your Care?: None identified  Patient Specific Preferences: None reported  Goal: Discharge Needs Assessment  Outcome: Ongoing (interventions implemented as appropriate)  Flowsheets (Taken 5/7/2020 1503)  Outpatient/Agency/Support Group Needs: outpatient counseling; outpatient medication management;  outpatient psychiatric care (specify); case management; other (see comments)  Discharge Coordination/Progress: Patient has Medicare insurance. He has signed consent [facility] referral.  Concerns Comments: No comment  Transportation Anticipated: public transportation  Anticipated Discharge Disposition: other health care institution not elsewhere defined  Transportation Concerns: car, none  Current Discharge Risk: psychiatric illness; chronically ill  Concerns to be Addressed: mental health; coping/stress; cognitive/perceptual; suicidal  Readmission Within the Last 30 Days: no previous admission in last 30 days  Patient/Family Anticipated Services at Transition: outpatient care; mental health services  Patient's Choice of Community Agency(s): [facility]  Patient/Family Anticipates Transition to: other (see comments)  Offered/Gave Vendor List: no  Goal: Interprofessional Rounds/Family Conf  Outcome: Ongoing (interventions implemented as appropriate)  Flowsheets (Taken 5/7/2020 1503)  Participants: social work; patient; nursing; milieu/psych techs; family; psychiatrist  Summary: Treatment team staffing and patient evaluation     Problem: Overarching Goals (Adult)  Goal: Adheres to Safety Considerations for Self and Others  Outcome: Ongoing (interventions implemented as appropriate)  Goal: Optimized Coping Skills in Response to Life Stressors  Intervention: Promote Effective Coping Strategies  Flowsheets (Taken 5/7/2020 1503)  Supportive Measures: active listening utilized; counseling provided  Goal: Develops/Participates in Therapeutic [place] to Support Successful Transition  Intervention: Foster Therapeutic [place]  Flowsheets (Taken 5/7/2020 1503)  Trust Relationship/Rapport: care explained; questions encouraged; choices provided; reassurance provided; emotional support provided; thoughts/feelings acknowledged; empathic listening provided; questions answered  Intervention: Mutually Develop Transition  Plan  Flowsheets (Taken 5/7/2020 3136)  Transition Support: community resources reviewed; follow-up care coordinated; follow-up care discussed; crisis management plan promoted; crisis management plan verbalized

## 2020-05-07 NOTE — PLAN OF CARE
Problem: Pain, Chronic (Adult)  Goal: Identify Related Risk Factors and Signs and Symptoms  Outcome: Ongoing (interventions implemented as appropriate)  Note:   Pt is new admission this shift

## 2020-05-07 NOTE — PROGRESS NOTES
9947    DATA:      This provider is located at [facility], and the Patient  is seen remotely at [facility] using [facility]. The patient's condition being treated is appropriate for telehealth services. The provider identified herself as well as her credentials.   The patient has given consent to be seen remotely, and when consent is given they understand that the consent allows for patient identifiable information to be sent to a third party as needed.   They may refuse to be seen remotely at any time. The electronic data is encrypted and password protected, and the patient has been advised of the potential risks to privacy not withstanding such measures.     Therapist met individually with patient this date to introduce role and to discuss hospitalization expectations. Patient agreeable.     Patient signed consent for his sister Laura at [phone]; No answer this date.     Therapist learned that [facility] had openings and reviewed this with the patient.  He was agreeable to therapist sending a referral. Contacted [facility] this date and faxed referral to [phone]. [facility] reports that he needs a few days but will see if patient is appropriate for admission.      Clinical Maneuvering/Intervention:     Therapist assisted patient in processing above session content; acknowledged and normalized patient’s thoughts, feelings, and concerns.  Discussed the therapist/patient relationship and explain the parameters and limitations of relative confidentiality.  Also discussed the importance of active participation, and honesty to the treatment process.  Encouraged the patient to discuss/vent their feelings, frustrations, and fears concerning their ongoing medical issues and validated their feelings.     Discussed the importance of finding enjoyable activities and coping skills that the patient can engage in a regular basis. Discussed healthy coping skills such as distraction, self love,  Consent to Procedure/Sedation    Date: 3/23/2018    Time: _______________    1. I authorize the performance upon Anuj Brady the following:    Permament Dialysis Catheter Insertion    2.  I authorize Dr. Ana Cotto whomever is designated as the doct "grounding, thought challenges/reframing, etc.  Provided patient with list of healthy coping skills this date. Discussed the importance of medication compliance.  Praised the patient for seeking help and spent the majority of the session building rapport.       Allowed patient to freely discuss issues without interruption or judgment. Provided safe, confidential environment to facilitate the development of positive therapeutic relationship and encourage open, honest communication.      Therapist addressed discharge safety planning this date. Assisted patient in identifying risk factors which would indicate the need for higher level of care after discharge;  including thoughts to harm self or others and/or self-harming behavior. Encouraged patient to call 911, or present to the nearest emergency room should any of these events occur. Discussed crisis intervention services and means to access.  Encouraged securing any objects of harm.       Therapist completed integrated summary, treatment plan, and initiated social history this date.  Therapist is strongly encouraging family involvement in treatment.       ASSESSMENT:      The patient is a  54 y.o. single never , ,college-educated male who was admitted on 5/6/2020 with complaints of worsening paranoia where he hears people in the  banging on tables, banging on the walls, controlling his television, and controlling his brain.  He reports that the housekeepers at the [facility] where he lives \"are in on it\", he states that they are using machines to control his mind and when he has a thought they start banging.  Patient states he was doing good when he left the hospital in February, but he has been getting worse for the past several days.  He states he began having violent thoughts regarding these banging noises, and decided to come to the hospital before he acted on them.  He did not specify of violent thoughts towards any particular person.    States he has " ___________________________    ___________________    Witness: ____________________     Date: ______________    Printed: 3/23/2018   2:04 PM    Patient Name: Armando Noguera        : 1976       Medical Record #: [id number] "been feeling depressed, and has had thoughts of being better off dead but denies a specific plan he states these thoughts \"are scaring me\".  Reports having paranoia specifically relating to the  being involved in reading his brain.  Patient denies HI.  He denies current substance abuse issues.      PLAN:       Patient to remain hospitalized this date.     Treatment team will focus efforts on stabilizing patient's acute symptoms while providing education on healthy coping and crisis management to reduce hospitalizations.   Patient requires daily psychiatrist evaluation and 24/7 nursing supervision to promote patient  safety.     Therapist will offer 1-4 individual sessions, 1 therapy group daily, family education, and appropriate referral.    Therapist recommends [facility] referral.  Referral faxed this date.   " Detail Level: Generalized

## 2020-05-07 NOTE — DISCHARGE PLACEMENT REQUEST
"Jose Taylor (54 y.o. Male)     Date of Birth Social Security Number Address Home Phone MRN    1965  [employer]  [address] [facility]  [address] [place] [address] [phone] [id number]    [employer] Marital Status          None Single       Admission Date Admission Type Admitting Provider Attending Provider Department, Room/Bed    20 Urgent Bal Garcia MD Briscoe, Brian T, MD [facility] ADULT PSYCHIATRIC, 1021/2S    Discharge Date Discharge Disposition Discharge Destination                       Attending Provider:  Bal Garcia MD    Allergies:  Invega [Paliperidone Er], Risperidone And Related    Isolation:  None   Infection:  None   Code Status:  CPR    Ht:  170.2 cm (67\")   Wt:  105 kg (232 lb)    Admission Cmt:  None   Principal Problem:  Schizophrenia, paranoid type (CMS/HCC) [F20.0]                 Active Insurance as of 2020     Primary Coverage     Payor Plan Insurance Group Employer/Plan Group    MEDICARE MEDICARE A & B      Payor Plan Address Payor Plan Phone Number Payor Plan Fax Number Effective Dates    [address] [phone]  3/1/2019 - None Entered    [place] [id number]       Subscriber Name Subscriber Birth Date Member ID       JOSE TAYLOR 1965 [id number]                 Emergency Contacts      (Rel.) Home Phone Work Phone Mobile Phone    Nuris Sierra (Sister) -- -- [phone]            Insurance Information                MEDICARE/MEDICARE A & B Phone: [phone]    Subscriber: Jose Taylor Subscriber#: [id number]    Group#:  Precert#:              History & Physical      Cy Urena RN at 20 1057          INITIAL PSYCHIATRIC HISTORY & PHYSICAL    Patient Identification:  Name:   Jose Taylor  Age:   54 y.o.  Sex:   male  :   1965  MRN:   [id number]  Visit Number:   [id number]  Primary Care Physician:   Tray Garvey MD    SUBJECTIVE    CC/Focus of Exam: Psychosis    HPI: This is the " "second [facility] admission for Jose Taylor who is a 54 y.o. single never , , college-educated male who was admitted on 5/6/2020 with complaints of worsening paranoia where he hears people in the most he will that he lives in banging on tables, banging on the walls, controlling his television, and controlling his brain.  He reports that the housekeepers at the mot where he lives \"are in on it\", he states that they are using machines to control his mind and when he has a thought they start banging.  Patient states he was doing good when he left the hospital in February, but he has been getting worse for the past several days.  He states he began having violent thoughts regarding these banging noises, and decided to come to the hospital before he acted on them.  He did not specify of violent thoughts towards any particular person.  He reports he has been taking his medication every day, but does not think it is working.  States he has been feeling depressed, and has had thoughts of being better off dead but denies a specific plan he states these thoughts \"are scaring me\".  Reports having paranoia specifically relating to the  being involved in reading his brain.  He states this is a threat to him and his mind.  Patient reports \"I will do what ever you want me to do to get better\".    Available medical/psychiatric records reviewed and incorporated into the current document.     PAST PSYCHIATRIC HX: Patient reports a history of psychiatric treatment since his 30's.  Patient reports that he started having more issues with paranoia 6 years ago. Patient reports history of 3 other psychiatric hospitalizations. He reports two pervious admissions to [facility] and one previous admission to [facility].  History of outpatient treatment at [facility] and [facility]. Most recently, patient currently attending outpatient psychiatric treatment at [facility] and sees JUAN Morales " Earlene.  Patient has been diagnosed with Schizophrenia, paranoid type and Generalized Anxiety Disorder.     SUBSTANCE USE HX: Patient denies current use.  States he did abuse opiates and steroids in the 80s and 90s.  UDS negative for controlled substances.  UDS positive for tricyclic antidepressant in which she is prescribed.       FAMILY HX: Father has a history of paranoia and dementia, and had at least 1 hospitalization at [facility].  Mother and sister have a history of panic attacks.    SOCIAL HX: Patient was born and raised in [place], [place] by his biological parents.  Denies any abuse history.  Parents  at age 10 and he lived mainly with his father.  He was in the [employer] from 1690-9317 and received an honorable discharge.  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  Patient received 10-12 hair transplants, and also used steroids in the 80s and 90s because he struggled with self-esteem.  Patient's sister, Laura, is his payee and he has a good relationship with her.  Patient currently lives in a [facility], in [place], [place] for the past 2 years.  Patient states he did have some legal history surrounding his drug use in the 80s and 90s, but nothing since that time.    Past Medical History:   Diagnosis Date   • Anxiety    • Chronic pain disorder    • Delusional disorder (CMS/HCC)    • Depression    • Head injury    • Hormone disorder    • Hypertension    • Liver disease    • Low back pain    • Paranoid schizophrenia (CMS/HCC)    • Psychosis (CMS/HCC)        Past Surgical History:   Procedure Laterality Date   • BREAST SURGERY     • EXTERNAL EAR SURGERY     • HAIR TRANSPLANT     • MANDIBLE SURGERY     • NOSE SURGERY     • REPLACEMENT TOTAL KNEE     • SHOULDER SURGERY     • TONSILLECTOMY         Family History   Problem Relation Age of Onset   • Heart disease Mother    • Dementia Father          Medications Prior to Admission    Medication Sig Dispense Refill Last Dose   • amitriptyline (ELAVIL) 75 MG tablet Take 150 mg by mouth Every Night.   5/5/2020 at Unknown time   • lisinopril (PRINIVIL,ZESTRIL) 20 MG tablet Take 1 tablet by mouth Daily. Indications: High Blood Pressure Disorder 30 tablet 2 5/6/2020 at 0900   • metoprolol succinate XL (TOPROL-XL) 25 MG 24 hr tablet Take 1 tablet by mouth Daily. Indications: High Blood Pressure Disorder 30 tablet 2 5/6/2020 at 0900   • OLANZapine (ZyPREXA) 20 MG tablet Take 20 mg by mouth Daily.   5/6/2020 at 0900   • sertraline (ZOLOFT) 50 MG tablet Take 75 mg by mouth Daily.   5/6/2020 at 0900           ALLERGIES:  Invega [paliperidone er] and Risperidone and related    Temp:  [97.6 °F (36.4 °C)-99 °F (37.2 °C)] 98 °F (36.7 °C)  Heart Rate:  [] 66  Resp:  [18] 18  BP: (108-147)/(70-89) 108/70    REVIEW OF SYSTEMS:  Review of Systems   Constitutional: Positive for unexpected weight change.   HENT: Negative.    Eyes: Negative.    Respiratory: Negative.    Cardiovascular: Negative.    Gastrointestinal: Negative.    Endocrine: Negative.    Genitourinary: Negative.    Musculoskeletal: Negative.    Skin: Negative.    Allergic/Immunologic: Negative.    Neurological: Negative.    Hematological: Negative.    Psychiatric/Behavioral: Positive for hallucinations and suicidal ideas. The patient is nervous/anxious.       See HPI for psychiatric ROS  OBJECTIVE    PHYSICAL EXAM:  Physical Exam    MENTAL STATUS EXAM:               Hygiene:   fair  Cooperation:  Cooperative  Eye Contact:  Fair  Psychomotor Behavior:  Appropriate  Affect:  Blunted  Hopelessness: 3  Speech:  Normal  Thought Progress:  Linear  Thought Content:  Bizarre  Suicidal:  Death wish  Homicidal:  Patient was having violent thoughts about hurting people, but no one specific  Hallucinations:  Auditory  Delusion:  Paranoid  Memory:  Intact  Orientation:  Person, Place, Time and Situation  Reliability:  fair  Insight:  Fair  Judgement:   Fair  Impulse Control:  Fair      Imaging Results (Last 24 Hours)     ** No results found for the last 24 hours. **           ECG/EMG Results (most recent)     None           Lab Results   Component Value Date    GLUCOSE 100 (H) 05/06/2020    BUN 25 (H) 05/06/2020    CREATININE 0.95 05/06/2020    EGFRIFNONA 82 05/06/2020    BCR 26.3 (H) 05/06/2020    CO2 23.6 05/06/2020    CALCIUM 9.0 05/06/2020    ALBUMIN 3.80 05/06/2020    AST 45 (H) 05/06/2020    ALT 38 05/06/2020       Lab Results   Component Value Date    WBC 9.31 05/06/2020    HGB 15.0 05/06/2020    HCT 44.3 05/06/2020    MCV 90.8 05/06/2020     05/06/2020       Pain Management Panel     Pain Management Panel Latest Ref Rng & Units 5/6/2020 2/25/2020    AMPHETAMINES SCREEN, URINE Negative Negative Negative    BARBITURATES SCREEN Negative Negative Negative    BENZODIAZEPINE SCREEN, URINE Negative Negative Negative    BUPRENORPHINEUR Negative Negative Negative    COCAINE SCREEN, URINE Negative Negative Negative    METHADONE SCREEN, URINE Negative Negative Negative    METHAMPHETAMINEUR Negative Negative Negative          Brief Urine Lab Results  (Last result in the past 365 days)      Color   Clarity   Blood   Leuk Est   Nitrite   Protein   CREAT   Urine HCG        05/06/20 1855 Yellow Clear Negative Negative Negative Negative               Reviewed labs and studies done with this admission.       ASSESSMENT & PLAN:        Schizophrenia, paranoid type (CMS/HCC)    Hypertension        The patient has been admitted for safety and stabilization.  Patient will be monitored for suicidality daily and maintained on Special Precautions Level 3 (q15 min checks) . The patient will have individual and group therapy with a master's level therapist. A master treatment plan will be developed and agreed upon by the patient and his/her treatment team.  The patient's estimated length of stay in the hospital is 5-7 days.       Written by Ang Urena RN, acting as scribe for  Dr. Garcia. Dr. Garcia's signature on this note affirms that the note adequately documents the care provided.     Cy Urena RN  05/07/20  10:57 AM    Electronically signed by Cy Urena RN at 05/07/20 1119         Current Facility-Administered Medications   Medication Dose Route Frequency Provider Last Rate Last Dose   • aluminum-magnesium hydroxide-simethicone (MAALOX MAX) 400-400-40 MG/5ML suspension 15 mL  15 mL Oral Q6H PRN Bal Garcia MD       • amitriptyline (ELAVIL) tablet 75 mg  75 mg Oral Nightly Tye Garcia MD       • benzonatate (TESSALON) capsule 100 mg  100 mg Oral TID PRN Bal Garcia MD       • Cariprazine HCl (VRAYLAR) capsule capsule 1.5 mg  1.5 mg Oral Daily Tye Garcia MD   1.5 mg at 05/07/20 1233   • famotidine (PEPCID) tablet 20 mg  20 mg Oral BID PRN Bal Garcia MD       • hydrOXYzine (ATARAX) tablet 50 mg  50 mg Oral Q6H PRN Bal Garcia MD       • ibuprofen (ADVIL,MOTRIN) tablet 400 mg  400 mg Oral Q6H PRN Bal Garcia MD       • lisinopril (PRINIVIL,ZESTRIL) tablet 20 mg  20 mg Oral Daily Bal Garcia MD   20 mg at 05/07/20 0835   • loperamide (IMODIUM) capsule 2 mg  2 mg Oral Q2H PRN Bal Garcia MD       • magnesium hydroxide (MILK OF MAGNESIA) suspension 2400 mg/10mL 10 mL  10 mL Oral Daily PRN Bal Garcia MD       • metoprolol succinate XL (TOPROL-XL) 24 hr tablet 25 mg  25 mg Oral Q24H Bal Garcia MD   25 mg at 05/07/20 0835   • OLANZapine (zyPREXA) tablet 20 mg  20 mg Oral Daily Bal Garcia MD   20 mg at 05/07/20 0837   • ondansetron (ZOFRAN) tablet 4 mg  4 mg Oral Q6H PRN Bal Garcia MD       • sertraline (ZOLOFT) tablet 75 mg  75 mg Oral Daily Bal Garcia MD   75 mg at 05/07/20 0836   • sodium chloride nasal spray 2 spray  2 spray Each Nare PRN Bal Garcia MD       • traZODone (DESYREL) tablet 50 mg  50 mg Oral Nightly PRN Bal Garcia MD

## 2020-05-08 LAB
ANION GAP SERPL CALCULATED.3IONS-SCNC: 8.8 MMOL/L (ref 5–15)
BUN BLD-MCNC: 21 MG/DL (ref 6–20)
BUN/CREAT SERPL: 26.6 (ref 7–25)
CALCIUM SPEC-SCNC: 8.7 MG/DL (ref 8.6–10.5)
CHLORIDE SERPL-SCNC: 106 MMOL/L (ref 98–107)
CHOLEST SERPL-MCNC: 174 MG/DL (ref 0–200)
CO2 SERPL-SCNC: 26.2 MMOL/L (ref 22–29)
CREAT BLD-MCNC: 0.79 MG/DL (ref 0.76–1.27)
GFR SERPL CREATININE-BSD FRML MDRD: 101 ML/MIN/1.73
GLUCOSE BLD-MCNC: 93 MG/DL (ref 65–99)
HBA1C MFR BLD: 5.4 % (ref 4.8–5.6)
HDLC SERPL-MCNC: 27 MG/DL (ref 40–60)
LDLC SERPL CALC-MCNC: 124 MG/DL (ref 0–100)
LDLC/HDLC SERPL: 4.6 {RATIO}
POTASSIUM BLD-SCNC: 4.1 MMOL/L (ref 3.5–5.2)
SODIUM BLD-SCNC: 141 MMOL/L (ref 136–145)
TRIGL SERPL-MCNC: 114 MG/DL (ref 0–150)
VLDLC SERPL-MCNC: 22.8 MG/DL

## 2020-05-08 PROCEDURE — 80061 LIPID PANEL: CPT | Performed by: PSYCHIATRY & NEUROLOGY

## 2020-05-08 PROCEDURE — 83036 HEMOGLOBIN GLYCOSYLATED A1C: CPT | Performed by: PSYCHIATRY & NEUROLOGY

## 2020-05-08 PROCEDURE — 80048 BASIC METABOLIC PNL TOTAL CA: CPT | Performed by: PSYCHIATRY & NEUROLOGY

## 2020-05-08 PROCEDURE — 99232 SBSQ HOSP IP/OBS MODERATE 35: CPT | Performed by: PSYCHIATRY & NEUROLOGY

## 2020-05-08 RX ORDER — AMITRIPTYLINE HYDROCHLORIDE 50 MG/1
50 TABLET, FILM COATED ORAL NIGHTLY
Status: DISCONTINUED | OUTPATIENT
Start: 2020-05-08 | End: 2020-05-11

## 2020-05-08 RX ADMIN — CARIPRAZINE 1.5 MG: 1.5 CAPSULE, GELATIN COATED ORAL at 09:15

## 2020-05-08 RX ADMIN — LISINOPRIL 20 MG: 10 TABLET ORAL at 09:15

## 2020-05-08 RX ADMIN — METOPROLOL SUCCINATE 25 MG: 25 TABLET, EXTENDED RELEASE ORAL at 09:15

## 2020-05-08 RX ADMIN — SERTRALINE 75 MG: 50 TABLET, FILM COATED ORAL at 09:16

## 2020-05-08 RX ADMIN — OLANZAPINE 20 MG: 10 TABLET, FILM COATED ORAL at 09:16

## 2020-05-08 RX ADMIN — AMITRIPTYLINE HYDROCHLORIDE 50 MG: 50 TABLET, FILM COATED ORAL at 20:32

## 2020-05-08 NOTE — PROGRESS NOTES
"INPATIENT PSYCHIATRIC PROGRESS NOTE    Name:  Jose Taylor  :  1965  MRN:  [id number]  Visit Number:  [id number]  Length of stay:  2        This patient visit is electronic.  The patient gave consent to be seen remotely, and when consent is given they understand that the consent allows for patient identifiable information to be sent to a third party as needed.   They may refuse to be seen remotely at any time. The electronic data is encrypted and password protected, and the patient has been advised of the potential risks to privacy not withstanding such measures.    Behavioral Health Treatment Plan and Problem List: I have reviewed and approved the Behavioral Health Treatment Plan and Problem list.    SUBJECTIVE    CC/ Focus of exam: psychosis/ depression    Patient's subjective status: \"Some better\"    INTERVAL HISTORY: Patient reports that he slept well although experienced the auditory hallucinations of \"people banging over me and out in the [place]\" that precipitated thoughts that he was in danger of a paranoid nature.  Subjectively improved today primarily having to do with the secure environment of the unit.  Patient in agreement with the treatment plan and medication change.    Surprisingly patient's metabolic lab work fairly normal aside from a reduced HDL and slightly elevated LDL level.  Hemoglobin A1c 5.4.  Patient does state that he walks approximately 2 miles 3 to 4 days a week, discussed optimizing this.    EKG to be done today.    Depression rating 5/10  Anxiety rating 4/10  Sleep: Slept well     Review of Systems   Respiratory: Negative.    Cardiovascular: Negative.    Neurological: Negative.          OBJECTIVE    Temp:  [97.2 °F (36.2 °C)-98.2 °F (36.8 °C)] 98.2 °F (36.8 °C)  Heart Rate:  [] 106  Resp:  [18] 18  BP: ()/(64-81) 112/81    MENTAL STATUS EXAM:      Appearance:Casually dressed, good hygeine.   Cooperation:Cooperative  Psychomotor: No psychomotor agitation/retardation, " No EPS, No motor tics  Speech-normal rate, amount.  Mood/Affect:  Restricted  Thought Processes:  coherent  Thought Content:  dilussional and paranoid  Hallucination(s): auditory  Hopelessness: No  Optimistic:minimally  Suicidal Thoughts:   No  Suicidal Plan/Intent:  No  Homicidal Thoughts:  absent  Orientation: oriented x 3  Memory: Immediate, recent, recent remote, remote intact    Lab Results (last 24 hours)     Procedure Component Value Units Date/Time    Lipid Panel [[id number]]  (Abnormal) Collected:  05/08/20 0529    Specimen:  Blood Updated:  05/08/20 0557     Total Cholesterol 174 mg/dL      Triglycerides 114 mg/dL      HDL Cholesterol 27 mg/dL      LDL Cholesterol  124 mg/dL      VLDL Cholesterol 22.8 mg/dL      LDL/HDL Ratio 4.60    Narrative:       Cholesterol Reference Ranges  (U.S. Department of Health and Human Services ATP III Classifications)    Desirable          <200 mg/dL  Borderline High    200-239 mg/dL  High Risk          >240 mg/dL      Triglyceride Reference Ranges  (U.S. Department of Health and Human Services ATP III Classifications)    Normal           <150 mg/dL  Borderline High  150-199 mg/dL  High             200-499 mg/dL  Very High        >500 mg/dL    HDL Reference Ranges  (U.S. Department of Health and Human Services ATP III Classifcations)    Low     <40 mg/dl (major risk factor for CHD)  High    >60 mg/dl ('negative' risk factor for CHD)        LDL Reference Ranges  (U.S. Department of Health and Human Services ATP III Classifcations)    Optimal          <100 mg/dL  Near Optimal     100-129 mg/dL  Borderline High  130-159 mg/dL  High             160-189 mg/dL  Very High        >189 mg/dL    Basic Metabolic Panel [[id number]]  (Abnormal) Collected:  05/08/20 0529    Specimen:  Blood Updated:  05/08/20 0557     Glucose 93 mg/dL      BUN 21 mg/dL      Creatinine 0.79 mg/dL      Sodium 141 mmol/L      Potassium 4.1 mmol/L      Chloride 106 mmol/L      CO2 26.2 mmol/L      Calcium 8.7  mg/dL      eGFR Non African Amer 101 mL/min/1.73      BUN/Creatinine Ratio 26.6     Anion Gap 8.8 mmol/L     Narrative:       GFR Normal >60  Chronic Kidney Disease <60  Kidney Failure <15      Hemoglobin A1c [[id number]]  (Normal) Collected:  05/08/20 0529    Specimen:  Blood Updated:  05/08/20 0548     Hemoglobin A1C 5.40 %     Narrative:       Hemoglobin A1C Ranges:    Increased Risk for Diabetes  5.7% to 6.4%  Diabetes                     >= 6.5%  Diabetic Goal                < 7.0%           Imaging Results (Last 24 Hours)     ** No results found for the last 24 hours. **           ECG/EMG Results (most recent)     None           ALLERGIES: Invega [paliperidone er] and Risperidone and related      Current Facility-Administered Medications:   •  aluminum-magnesium hydroxide-simethicone (MAALOX MAX) 400-400-40 MG/5ML suspension 15 mL, 15 mL, Oral, Q6H PRN, Bal Garcia MD  •  amitriptyline (ELAVIL) tablet 75 mg, 75 mg, Oral, Nightly, Tye Garcia MD, 75 mg at 05/07/20 2122  •  benzonatate (TESSALON) capsule 100 mg, 100 mg, Oral, TID PRN, Bal Garcia MD  •  Cariprazine HCl (VRAYLAR) capsule capsule 1.5 mg, 1.5 mg, Oral, Daily, Tye Garcia MD, 1.5 mg at 05/07/20 1233  •  famotidine (PEPCID) tablet 20 mg, 20 mg, Oral, BID PRN, Bal Garcia MD  •  hydrOXYzine (ATARAX) tablet 50 mg, 50 mg, Oral, Q6H PRN, Bal Garcia MD  •  ibuprofen (ADVIL,MOTRIN) tablet 400 mg, 400 mg, Oral, Q6H PRN, Bal Garcia MD  •  lisinopril (PRINIVIL,ZESTRIL) tablet 20 mg, 20 mg, Oral, Daily, Bal Garcia MD, 20 mg at 05/07/20 0835  •  loperamide (IMODIUM) capsule 2 mg, 2 mg, Oral, Q2H PRN, Bal Garcia MD  •  magnesium hydroxide (MILK OF MAGNESIA) suspension 2400 mg/10mL 10 mL, 10 mL, Oral, Daily PRN, Bal Garcia MD  •  metoprolol succinate XL (TOPROL-XL) 24 hr tablet 25 mg, 25 mg, Oral, Q24H, Bal Garcia MD, 25 mg at 05/07/20 0835  •  OLANZapine (zyPREXA) tablet 20  mg, 20 mg, Oral, Daily, Bal Garcia MD, 20 mg at 05/07/20 0837  •  ondansetron (ZOFRAN) tablet 4 mg, 4 mg, Oral, Q6H PRN, Bal Garcia MD  •  sertraline (ZOLOFT) tablet 75 mg, 75 mg, Oral, Daily, Bal Garcia MD, 75 mg at 05/07/20 0836  •  sodium chloride nasal spray 2 spray, 2 spray, Each Nare, PRN, Bal Garcia MD  •  traZODone (DESYREL) tablet 50 mg, 50 mg, Oral, Nightly PRN, Bal Garcia MD    ASSESSMENT & PLAN      Schizophrenia, paranoid type (CMS/HCC)  We will initially increase the dose of Zyprexa adding in Cariprazine and the individual and group psychotherapeutic effort.   Other depressive disorder recurrent, severe  Will be attempting to taper off the amitriptyline increasing the Zoloft plus the psychotherapeutic effort    Hypertension  Lisinopril, metoprolol      Special precautions: Special Precautions Level 3 (q15 min checks)     Behavioral Health Treatment Plan and Problem List: I have reviewed and approved the Behavioral Health Treatment Plan and Problem list.    I spent a total of 26 minutes in direct patient care including 17 minutes face to face with the patient assessment, coordination of care, and counseling the patient on the current and follow-up treatment plans regarding his status. Answered patient questions regarding the medication..    SANDRO Garcia MD    Clinician:  Tye Garcia MD  05/08/20  09:08    Dictated utilizing Dragon dictation

## 2020-05-08 NOTE — PLAN OF CARE
Problem: Patient Care Overview  Goal: Interprofessional Rounds/Family Conf  Outcome: Ongoing (interventions implemented as appropriate)  Flowsheets  Taken 5/7/2020 1503  Participants: social work;patient;nursing;milieu/psych techs;family;psychiatrist  Taken 5/8/2020 1120  Summary: Treatment team staffing      1120:    DATA:      This provider is located at [facility], and the Patient  is seen remotely at [facility] using [facility]. The patient's condition being treated is appropriate for telehealth services. The provider identified herself as well as her credentials.   The patient has given consent to be seen remotely, and when consent is given they understand that the consent allows for patient identifiable information to be sent to a third party as needed.   They may refuse to be seen remotely at any time. The electronic data is encrypted and password protected, and the patient has been advised of the potential risks to privacy not withstanding such measures.     Therapist met individually with patient this date. Patient agreeable. Continued reviewing plan of care and aftercare planning.  Contacted Rayo at the [facility]. There are some questions about patient meet criteria, Specifically, the patient has Medicare an Medicaid is recommended because patient attend a TRP and this has to be billed to Medicaid.  Rayo reports that they do have some Medicare patient's, but that they have to pay out of pocket for the TRP and have to agree to the rules and attending TRP.  He reports that the patient would also have to pay 30% of his income towards rent.      Therapist contacted patient's sister Laura at [phone]: Laura was supportive. Reviewed referral with [facility] and Laura appeared pleased and hopeful that this would work out. Laura reported that she would be agreeable to patient's money being used for housing and therapy at [facility] if it cold work out. Laura  is payee and reports that patient receives $1600.00 monthly.  She reports that if [facility] can work out a reasonable deal that she will try her best to assist the patient financially.       Clinical Maneuvering/Intervention:     Therapist assisted patient in processing above session content; acknowledged and normalized patient’s thoughts, feelings, and concerns.  Discussed the therapist/patient relationship and explain the parameters and limitations of relative confidentiality.  Also discussed the importance of active participation, and honesty to the treatment process.  Encouraged the patient to discuss/vent their feelings, frustrations, and fears concerning their ongoing medical issues and validated their feelings.     Discussed the importance of finding enjoyable activities and coping skills that the patient can engage in a regular basis. Discussed healthy coping skills such as distraction, self love, grounding, thought challenges/reframing, etc.  Provided patient with list of healthy coping skills this date. Discussed the importance of medication compliance.  Praised the patient for seeking help and spent the majority of the session building rapport.      Educated patient on the importance of social distancing and regular hand washing during COVID19.      Allowed patient to freely discuss issues without interruption or judgment. Provided safe, confidential environment to facilitate the development of positive therapeutic relationship and encourage open, honest communication.      Therapist addressed discharge safety planning this date. Assisted patient in identifying risk factors which would indicate the need for higher level of care after discharge;  including thoughts to harm self or others and/or self-harming behavior. Encouraged patient to call 911, or present to the nearest emergency room should any of these events occur. Discussed crisis intervention services and means to access.  Encouraged securing  any objects of harm.       ASSESSMENT:      The patient was seen for follow up this date.  Patient calm and cooperative. Patient reports that he is still feeling some better.  He reports that he feels safe in the hospital away from the loud banging that the motel.  Patient denies SI/HI, but remains paranoid.  Patient rates depression at 5/10 and anxiety at 4/10.       PLAN:       Patient to remain hospitalized this date.     Treatment team will focus efforts on stabilizing patient's acute symptoms while providing education on healthy coping and crisis management to reduce hospitalizations.   Patient requires daily psychiatrist evaluation and 24/7 nursing supervision to promote patient  safety.     Therapist will offer 1-4 individual sessions, 1 therapy group daily, family education, and appropriate referral.    Therapist recommends [facility] referral. Referral pending.

## 2020-05-08 NOTE — PLAN OF CARE
Problem: Pain, Chronic (Adult)  Goal: Identify Related Risk Factors and Signs and Symptoms  5/8/2020 1206 by Ian Elliott, RN  Outcome: Ongoing (interventions implemented as appropriate)  Note:   PATIENT VERBALIZES ANXIETY AND DEPRESSION 8/10. PATIENT DENIES ANY OTHER PROBLEMS AT THIS TIME. PATIENT IS AOX3, APPEARS STABLE WITH NO S/S OF DISTRESS NOTED  5/8/2020 1205 by Ian Elliott, RN  Outcome: Ongoing (interventions implemented as appropriate)

## 2020-05-08 NOTE — PLAN OF CARE
Problem: Patient Care Overview  Goal: Plan of Care Review  Outcome: Ongoing (interventions implemented as appropriate)  Flowsheets  Taken 5/8/2020 0513  Progress: no change  Outcome Summary: Pt calm and cooperative. Rates anxiety depression 8/10. Denies SI HI AVH.  Taken 5/7/2020 9813  Plan of Care Reviewed With: patient  Patient Agreement with Plan of Care: agrees

## 2020-05-09 PROCEDURE — 99232 SBSQ HOSP IP/OBS MODERATE 35: CPT | Performed by: PSYCHIATRY & NEUROLOGY

## 2020-05-09 RX ADMIN — LISINOPRIL 20 MG: 10 TABLET ORAL at 08:27

## 2020-05-09 RX ADMIN — AMITRIPTYLINE HYDROCHLORIDE 50 MG: 50 TABLET, FILM COATED ORAL at 20:49

## 2020-05-09 RX ADMIN — OLANZAPINE 20 MG: 10 TABLET, FILM COATED ORAL at 08:28

## 2020-05-09 RX ADMIN — CARIPRAZINE 3 MG: 3 CAPSULE, GELATIN COATED ORAL at 08:27

## 2020-05-09 RX ADMIN — METOPROLOL SUCCINATE 25 MG: 25 TABLET, EXTENDED RELEASE ORAL at 08:27

## 2020-05-09 RX ADMIN — SERTRALINE 100 MG: 50 TABLET, FILM COATED ORAL at 08:28

## 2020-05-09 NOTE — PLAN OF CARE
Problem: Pain, Chronic (Adult)  Goal: Identify Related Risk Factors and Signs and Symptoms  Outcome: Ongoing (interventions implemented as appropriate)  Flowsheets (Taken 5/9/2020 2349)  Related Risk Factors (Chronic Pain): coping ineffective

## 2020-05-09 NOTE — PROGRESS NOTES
"INPATIENT PSYCHIATRIC PROGRESS NOTE    Name:  Jose Taylor  :  1965  MRN:  [id number]  Visit Number:  [id number]  Length of stay:  3    SUBJECTIVE  CC/Focus of Exam: schizophrenia    INTERVAL HISTORY:  The patient reports he is feeling better though earlier he felt paranoid and felt that the staff was against him but after he came out of his room and interacted with staff he realized it was not the case.    Depression rating 7/10  Anxiety rating 7/10  Sleep:poor  Withdrawal sx:denies  Cravin/10    Review of Systems   Constitutional: Negative.    Respiratory: Negative.    Cardiovascular: Negative.    Psychiatric/Behavioral: Positive for dysphoric mood. The patient is nervous/anxious.        OBJECTIVE    Temp:  [97.2 °F (36.2 °C)-97.5 °F (36.4 °C)] 97.2 °F (36.2 °C)  Heart Rate:  [90] 90  Resp:  [18] 18  BP: (107-116)/(78-84) 116/84    MENTAL STATUS EXAM:  Appearance:Casually dressed, good hygeine.   Cooperation:Cooperative  Psychomotor: No psychomotor agitation/retardation, No EPS, No motor tics  Speech-normal rate, amount.  Mood \"depressed\"   Affect-congruent, appropriate, stable  Thought Content-goal directed, no delusional material present  Thought process-linear, organized.  Suicidality: No SI  Homicidality: No HI  Perception: No AH/VH  Insight-fair   Judgement-fair    Lab Results (last 24 hours)     ** No results found for the last 24 hours. **             Imaging Results (Last 24 Hours)     ** No results found for the last 24 hours. **             ECG/EMG Results (most recent)     None           ALLERGIES: Invega [paliperidone er] and Risperidone and related      Current Facility-Administered Medications:   •  aluminum-magnesium hydroxide-simethicone (MAALOX MAX) 400-400-40 MG/5ML suspension 15 mL, 15 mL, Oral, Q6H PRN, Bal Garcia MD  •  amitriptyline (ELAVIL) tablet 50 mg, 50 mg, Oral, Nightly, Tye Garcia MD, 50 mg at 20  •  benzonatate (TESSALON) capsule 100 mg, " 100 mg, Oral, TID PRN, Bal Garcia MD  •  Cariprazine HCl (VRAYLAR) capsule capsule 3 mg, 3 mg, Oral, Daily, Tye Garcia MD, 3 mg at 05/09/20 0827  •  famotidine (PEPCID) tablet 20 mg, 20 mg, Oral, BID PRN, Bal Garcia MD  •  hydrOXYzine (ATARAX) tablet 50 mg, 50 mg, Oral, Q6H PRN, Bal Garcia MD  •  ibuprofen (ADVIL,MOTRIN) tablet 400 mg, 400 mg, Oral, Q6H PRN, Bal Garcia MD  •  lisinopril (PRINIVIL,ZESTRIL) tablet 20 mg, 20 mg, Oral, Daily, Bal Garcia MD, 20 mg at 05/09/20 0827  •  loperamide (IMODIUM) capsule 2 mg, 2 mg, Oral, Q2H PRN, Bal Garcia MD  •  magnesium hydroxide (MILK OF MAGNESIA) suspension 2400 mg/10mL 10 mL, 10 mL, Oral, Daily PRN, Bal Garcia MD  •  metoprolol succinate XL (TOPROL-XL) 24 hr tablet 25 mg, 25 mg, Oral, Q24H, Bal Garcia MD, 25 mg at 05/09/20 0827  •  OLANZapine (zyPREXA) tablet 20 mg, 20 mg, Oral, Daily, Bal Garcia MD, 20 mg at 05/09/20 0828  •  ondansetron (ZOFRAN) tablet 4 mg, 4 mg, Oral, Q6H PRN, Bal Garcia MD  •  sertraline (ZOLOFT) tablet 100 mg, 100 mg, Oral, Daily, Tye Garcia MD, 100 mg at 05/09/20 0828  •  sodium chloride nasal spray 2 spray, 2 spray, Each Nare, PRN, Bal Garcia MD  •  traZODone (DESYREL) tablet 50 mg, 50 mg, Oral, Nightly PRN, Bal Garcia MD    ASSESSMENT & PLAN:     Schizophrenia, paranoid type (CMS/HCC)  - Continue Zyprexa and Vraylar       Other depressive disorder recurrent, severe  - Continue Zoloft      Hypertension  - Continue Lisinopril, metoprolol    Special precautions: Special Precautions Level 3 (q15 min checks) .    Behavioral Health Treatment Plan and Problem List: I have reviewed and approved the Behavioral Health Treatment Plan and Problem list.  The patient has had a chance to review and agrees with the treatment plan.     Clinician:  Radhames York MD  05/09/20  12:23

## 2020-05-09 NOTE — PLAN OF CARE
Problem: Pain, Chronic (Adult)  Goal: Identify Related Risk Factors and Signs and Symptoms  Outcome: Ongoing (interventions implemented as appropriate)     Problem: Pain, Chronic (Adult)  Goal: Identify Related Risk Factors and Signs and Symptoms  Outcome: Ongoing (interventions implemented as appropriate)     Problem: Patient Care Overview  Goal: Plan of Care Review  Outcome: Ongoing (interventions implemented as appropriate)  Flowsheets (Taken 5/9/2020 6501)  Progress: improving  Plan of Care Reviewed With: patient  Patient Agreement with Plan of Care: agrees  Note:   Patient has stayed isolated from other peers walked halls a lot. Denies SI, Hi, or AVH States he doesn't want to hurt himself or others. Paranoid although eating and sleeping well

## 2020-05-10 PROCEDURE — 99232 SBSQ HOSP IP/OBS MODERATE 35: CPT | Performed by: PSYCHIATRY & NEUROLOGY

## 2020-05-10 RX ADMIN — LISINOPRIL 20 MG: 10 TABLET ORAL at 08:23

## 2020-05-10 RX ADMIN — OLANZAPINE 20 MG: 10 TABLET, FILM COATED ORAL at 08:23

## 2020-05-10 RX ADMIN — CARIPRAZINE 3 MG: 3 CAPSULE, GELATIN COATED ORAL at 08:23

## 2020-05-10 RX ADMIN — SERTRALINE 100 MG: 50 TABLET, FILM COATED ORAL at 08:23

## 2020-05-10 RX ADMIN — METOPROLOL SUCCINATE 25 MG: 25 TABLET, EXTENDED RELEASE ORAL at 08:23

## 2020-05-10 RX ADMIN — AMITRIPTYLINE HYDROCHLORIDE 50 MG: 50 TABLET, FILM COATED ORAL at 20:18

## 2020-05-10 NOTE — PLAN OF CARE
Problem: Pain, Chronic (Adult)  Goal: Identify Related Risk Factors and Signs and Symptoms  Outcome: Ongoing (interventions implemented as appropriate)  Flowsheets  Taken 5/9/2020 0357 by Lilia Downing RN  Related Risk Factors (Chronic Pain): coping ineffective  Taken 5/10/2020 0516 by Cristina Bran RN  Signs and Symptoms (Chronic Pain): verbalization of pain/discomfort for a prolonged time period

## 2020-05-10 NOTE — PLAN OF CARE
Problem: Patient Care Overview  Goal: Plan of Care Review  5/10/2020 1713 by Adrienne Taylor RN  Outcome: Ongoing (interventions implemented as appropriate)  Flowsheets (Taken 5/10/2020 1713)  Progress: no change  Plan of Care Reviewed With: patient  Patient Agreement with Plan of Care: agrees  Note:   CLIENT COOPERATIVE WITH STAFF DURING THIS SHIFT, STAYS TO SELF IN ROOM OFTEN.

## 2020-05-10 NOTE — PROGRESS NOTES
"INPATIENT PSYCHIATRIC PROGRESS NOTE    Name:  Jose Taylor  :  1965  MRN:  [id number]  Visit Number:  [id number]  Length of stay:  4    SUBJECTIVE  CC/Focus of Exam: schizophrenia    INTERVAL HISTORY:  The patient reports he is feeling better and thinks the medications are helping as he is not experiencing any bad paranoid thoughts.    Depression rating 7/10  Anxiety rating 7/10  Sleep:poor  Withdrawal sx:denies  Cravin/10    Review of Systems   Constitutional: Negative.    Respiratory: Negative.    Cardiovascular: Negative.    Psychiatric/Behavioral: Positive for dysphoric mood. The patient is nervous/anxious.        OBJECTIVE    Temp:  [97.4 °F (36.3 °C)-97.8 °F (36.6 °C)] 97.4 °F (36.3 °C)  Heart Rate:  [] 103  Resp:  [18] 18  BP: (106-112)/(75-77) 112/77    MENTAL STATUS EXAM:  Appearance:Casually dressed, good hygeine.   Cooperation:Cooperative  Psychomotor: No psychomotor agitation/retardation, No EPS, No motor tics  Speech-normal rate, amount.  Mood \"depressed\"   Affect-congruent, appropriate, stable  Thought Content-goal directed, no delusional material present  Thought process-linear, organized.  Suicidality: No SI  Homicidality: No HI  Perception: No AH/VH  Insight-fair   Judgement-fair    Lab Results (last 24 hours)     ** No results found for the last 24 hours. **             Imaging Results (Last 24 Hours)     ** No results found for the last 24 hours. **             ECG/EMG Results (most recent)     None           ALLERGIES: Invega [paliperidone er] and Risperidone and related      Current Facility-Administered Medications:   •  aluminum-magnesium hydroxide-simethicone (MAALOX MAX) 400-400-40 MG/5ML suspension 15 mL, 15 mL, Oral, Q6H PRN, Bal Garcia MD  •  amitriptyline (ELAVIL) tablet 50 mg, 50 mg, Oral, Nightly, Tye Garcia MD, 50 mg at 20  •  benzonatate (TESSALON) capsule 100 mg, 100 mg, Oral, TID PRN, Bal Garcia MD  •  Cariprazine HCl " (VRAYLAR) capsule capsule 3 mg, 3 mg, Oral, Daily, Tye Garcia MD, 3 mg at 05/10/20 0823  •  famotidine (PEPCID) tablet 20 mg, 20 mg, Oral, BID PRN, Bal Garcia MD  •  hydrOXYzine (ATARAX) tablet 50 mg, 50 mg, Oral, Q6H PRN, Bal Garcia MD  •  ibuprofen (ADVIL,MOTRIN) tablet 400 mg, 400 mg, Oral, Q6H PRN, Bal Garcia MD  •  lisinopril (PRINIVIL,ZESTRIL) tablet 20 mg, 20 mg, Oral, Daily, Bal Garcia MD, 20 mg at 05/10/20 0823  •  loperamide (IMODIUM) capsule 2 mg, 2 mg, Oral, Q2H PRN, Bal Garcia MD  •  magnesium hydroxide (MILK OF MAGNESIA) suspension 2400 mg/10mL 10 mL, 10 mL, Oral, Daily PRN, Bal Garcia MD  •  metoprolol succinate XL (TOPROL-XL) 24 hr tablet 25 mg, 25 mg, Oral, Q24H, Bal Garcia MD, 25 mg at 05/10/20 0823  •  OLANZapine (zyPREXA) tablet 20 mg, 20 mg, Oral, Daily, Bal Garcia MD, 20 mg at 05/10/20 0823  •  ondansetron (ZOFRAN) tablet 4 mg, 4 mg, Oral, Q6H PRN, Bal Garcia MD  •  sertraline (ZOLOFT) tablet 100 mg, 100 mg, Oral, Daily, Tye Garcia MD, 100 mg at 05/10/20 0823  •  sodium chloride nasal spray 2 spray, 2 spray, Each Nare, PRN, Bal Garcia MD  •  traZODone (DESYREL) tablet 50 mg, 50 mg, Oral, Nightly PRN, Bal Garcia MD    ASSESSMENT & PLAN:     Schizophrenia, paranoid type (CMS/HCC)  - Continue Zyprexa and Vraylar       Other depressive disorder recurrent, severe  - Continue Zoloft      Hypertension  - Continue Lisinopril, metoprolol    Special precautions: Special Precautions Level 3 (q15 min checks) .    Behavioral Health Treatment Plan and Problem List: I have reviewed and approved the Behavioral Health Treatment Plan and Problem list.  The patient has had a chance to review and agrees with the treatment plan.     Clinician:  Radhames York MD  05/10/20  13:10

## 2020-05-11 PROCEDURE — 99232 SBSQ HOSP IP/OBS MODERATE 35: CPT | Performed by: PSYCHIATRY & NEUROLOGY

## 2020-05-11 RX ORDER — FLUPHENAZINE HYDROCHLORIDE 5 MG/1
5 TABLET ORAL DAILY
Status: DISCONTINUED | OUTPATIENT
Start: 2020-05-11 | End: 2020-05-12 | Stop reason: HOSPADM

## 2020-05-11 RX ADMIN — TRAZODONE HYDROCHLORIDE 50 MG: 50 TABLET ORAL at 20:11

## 2020-05-11 RX ADMIN — LISINOPRIL 20 MG: 10 TABLET ORAL at 08:22

## 2020-05-11 RX ADMIN — SERTRALINE 100 MG: 50 TABLET, FILM COATED ORAL at 08:23

## 2020-05-11 RX ADMIN — FLUPHENAZINE HYDROCHLORIDE 5 MG: 5 TABLET, FILM COATED ORAL at 10:11

## 2020-05-11 RX ADMIN — OLANZAPINE 20 MG: 10 TABLET, FILM COATED ORAL at 08:23

## 2020-05-11 RX ADMIN — METOPROLOL SUCCINATE 25 MG: 25 TABLET, EXTENDED RELEASE ORAL at 08:22

## 2020-05-11 RX ADMIN — CARIPRAZINE 3 MG: 3 CAPSULE, GELATIN COATED ORAL at 08:22

## 2020-05-11 NOTE — PLAN OF CARE
"  Problem: Patient Care Overview  Goal: Plan of Care Review  Outcome: Ongoing (interventions implemented as appropriate)  Flowsheets (Taken 5/11/2020 1313)  Progress: improving  Plan of Care Reviewed With: patient  Patient Agreement with Plan of Care: agrees  Outcome Summary: Patient continues to isolate in his room with paranoia stating \"I have been this way for 18 years but I am started to feel better with medication. Patient appetite and sleep are good with no SI, HI, or AVH reported. Patient states he always has anxiety and depression but they are manageable today. No acute distress noted, will continue to monitor.     "

## 2020-05-11 NOTE — PLAN OF CARE
Problem: Patient Care Overview  Goal: Interprofessional Rounds/Family Conf  Flowsheets (Taken 5/11/2020 1033)  Participants: milieu/psych techs; social work; nursing; psychiatrist  Summary: Treatment team staffing.     Problem: Overarching Goals (Adult)  Goal: Optimized Coping Skills in Response to Life Stressors  Intervention: Promote Effective Coping Strategies  Flowsheets (Taken 5/11/2020 1033)  Supportive Measures: active listening utilized; counseling provided; problem solving facilitated; verbalization of feelings encouraged; self-reflection promoted; self-responsibility promoted     Problem: Overarching Goals (Adult)  Goal: Develops/Participates in Therapeutic [place] to Support Successful Transition  Intervention: Foster Therapeutic [place]  Flowsheets (Taken 5/11/2020 1033)  Trust Relationship/Rapport: care explained; choices provided; thoughts/feelings acknowledged; emotional support provided; empathic listening provided; questions answered; questions encouraged; reassurance provided     Problem: Overarching Goals (Adult)  Goal: Develops/Participates in Therapeutic [place] to Support Successful Transition  Intervention: Mutually Develop Transition Plan  Flowsheets (Taken 5/11/2020 1033)  Transition Support: community resources reviewed; crisis management plan promoted; follow-up care discussed       Data:     Covering therapist met with patient, who was agreeable, for individual session.  Continued to discuss progress and treatment goals.  Educated patient about importance of safety and aftercare plans.  Reviewed patient's chart and staffed with treatment team.    Therapist spoke with Rayo from [facility] to confirm disposition planning.  Discussed that patient's sister/payee Laura is agreeable for patient's check to cover treatment and housing at [facility], that she is agreeable to assist patient financially if needed. Rayo reported that he prefers patient to attend [facility] upon discharge  "first so that he can meet with patient in person and work on patient's payment plan.      Patient consented for [facility].  Therapist spoke with Anita at [facility] who reported they anticipate open bed tomorrow; therapist faxed referral today.    Therapist spoke with patient's sister Laura at [phone] to discuss disposition planning.  She reported being agreeable for patient to attend [facility] and then [facility].  She reported she would obtain patient's belongings from [employer].  She appeared supportive of patient's treatment.    Patient seemed anxious of meeting new therapist though agreeable.  He discussed feeling better today and safe in the hospital, that he has not had any violent thoughts today.  He denied hallucinations and reported that nobody is bothering him now.  Patient denied suicidal thoughts and stated that he would never commit suicide because of his [employer] belief that he would go to [facility].  Patient reported having improved mood today though still somewhat anxious.  He identified his sister Laura as positive support.  Patient expressed concern that he has several belongings at [employer] in [place] and hopes his sister can assist with obtaining his belongings.  Patient informed that Rayo, the director at [facility], prefers patient to attend [facility] upon discharge first in order for him to meet with patient in person and work on patient's payment plan.  Patient agreeable for this plan.  Patient denied other concerns.      Assessment:    Patient is observed to display somewhat restricted affect affect and \"better\" mood.  Patient denied SI, HI, and AVH.  Patient oriented x3 with fair insight.  He reported improved sleep.    Plan:    Therapist will continue to assist daily with aftercare and safety planning as needed.  Patient anticipated to discharge tomorrow and has pending aftercare with [facility] upon discharge.      "

## 2020-05-11 NOTE — PROGRESS NOTES
"INPATIENT PSYCHIATRIC PROGRESS NOTE    Name:  Jose Taylor  :  1965  MRN:  [id number]  Visit Number:  [id number]  Length of stay:  5        This patient visit is electronic.  The patient gave consent to be seen remotely, and when consent is given they understand that the consent allows for patient identifiable information to be sent to a third party as needed.   They may refuse to be seen remotely at any time. The electronic data is encrypted and password protected, and the patient has been advised of the potential risks to privacy not withstanding such measures.    Behavioral Health Treatment Plan and Problem List: I have reviewed and approved the Behavioral Health Treatment Plan and Problem list.    SUBJECTIVE    CC/ Focus of exam: Psychosis/depression    Patient's subjective status: \"Doing better\"    INTERVAL HISTORY: Patient states he is less distressed in part due to the security felt here at the hospital although certainly less paranoid thought content and depression.  No active hallucinatory phenomena.  Discussed his medication, clearly will not be able to afford the Cariprazine (not covered by his Medicare) so we will need to change to a generic such as Prolixin (Fluphenazine), have discontinued the amitriptyline altogether today.  Anticipating discharge tomorrow a.m.    Depression rating 3/10  Anxiety rating 3/10  Sleep: Slept well         Review of Systems   Respiratory: Negative.    Cardiovascular: Negative.    Neurological: Negative.          OBJECTIVE    Temp:  [97.8 °F (36.6 °C)] 97.8 °F (36.6 °C)  Heart Rate:  [] 101  Resp:  [18] 18  BP: (108-135)/(80-83) 135/83    MENTAL STATUS EXAM:      Appearance:Casually dressed, good hygeine.   Cooperation:Cooperative  Psychomotor: No psychomotor agitation/retardation, No EPS, No motor tics  Speech-normal rate, amount.  Mood/Affect:  Restricted  Thought Processes:  coherent  Thought Content:  paranoid  Hallucination(s): none  Hopelessness: " No  Optimistic:minimally  Suicidal Thoughts:   No  Suicidal Plan/Intent:  No  Homicidal Thoughts:  absent  Orientation: oriented x 3  Memory: recent intact    Lab Results (last 24 hours)     ** No results found for the last 24 hours. **           Imaging Results (Last 24 Hours)     ** No results found for the last 24 hours. **           ECG/EMG Results (most recent)     None           ALLERGIES: Invega [paliperidone er] and Risperidone and related      Current Facility-Administered Medications:   •  aluminum-magnesium hydroxide-simethicone (MAALOX MAX) 400-400-40 MG/5ML suspension 15 mL, 15 mL, Oral, Q6H PRN, Bal Garcia MD  •  benzonatate (TESSALON) capsule 100 mg, 100 mg, Oral, TID PRN, Bal Garcia MD  •  famotidine (PEPCID) tablet 20 mg, 20 mg, Oral, BID PRN, Bal Garcia MD  •  fluPHENAZine (PROLIXIN) tablet 5 mg, 5 mg, Oral, Daily, Tye Garcia MD  •  hydrOXYzine (ATARAX) tablet 50 mg, 50 mg, Oral, Q6H PRN, Bal Garcia MD  •  ibuprofen (ADVIL,MOTRIN) tablet 400 mg, 400 mg, Oral, Q6H PRN, Bal Garcia MD  •  lisinopril (PRINIVIL,ZESTRIL) tablet 20 mg, 20 mg, Oral, Daily, Bal Garcia MD, 20 mg at 05/11/20 0822  •  loperamide (IMODIUM) capsule 2 mg, 2 mg, Oral, Q2H PRN, Bal Garcia MD  •  magnesium hydroxide (MILK OF MAGNESIA) suspension 2400 mg/10mL 10 mL, 10 mL, Oral, Daily PRN, Bal Garcia MD  •  metoprolol succinate XL (TOPROL-XL) 24 hr tablet 25 mg, 25 mg, Oral, Q24H, Bal Garcia MD, 25 mg at 05/11/20 0822  •  OLANZapine (zyPREXA) tablet 20 mg, 20 mg, Oral, Daily, Bal Garcia MD, 20 mg at 05/11/20 0823  •  ondansetron (ZOFRAN) tablet 4 mg, 4 mg, Oral, Q6H PRN, Bal Garcia MD  •  [START ON 5/12/2020] sertraline (ZOLOFT) tablet 150 mg, 150 mg, Oral, Daily, Tye Garcia MD  •  sodium chloride nasal spray 2 spray, 2 spray, Each Nare, PRN, Bal Garcia MD  •  traZODone (DESYREL) tablet 50 mg, 50 mg, Oral, Nightly PRN,  Bal Garcia MD    ASSESSMENT & PLAN      Schizophrenia, paranoid type (CMS/HCC)  We will initially increase the dose of Zyprexa changing to Prolixin given the non-affordability of the Cariprazine and the individual and group psychotherapeutic effort.   Other depressive disorder recurrent, severe  Have d/c the amitriptyline increasing the Zoloft to 150 mg daily plus the psychotherapeutic effort    Hypertension  Lisinopril, metoprolol      Special precautions: Special Precautions Level 3 (q15 min checks)     Behavioral Health Treatment Plan and Problem List: I have reviewed and approved the Behavioral Health Treatment Plan and Problem list.    I spent a total of 26 minutes in direct patient care including  16 minutes face to face with the patient assessment, coordination of care, and counseling the patient on the current and follow-up treatment plans regarding his status. Answered patient questions regarding the current medications..    SANDRO Garcia MD    Clinician:  Tye Garcia MD  05/11/20  08:39    Dictated utilizing Dragon dictation

## 2020-05-12 VITALS
TEMPERATURE: 97 F | SYSTOLIC BLOOD PRESSURE: 104 MMHG | WEIGHT: 232 LBS | HEART RATE: 81 BPM | HEIGHT: 67 IN | BODY MASS INDEX: 36.41 KG/M2 | OXYGEN SATURATION: 94 % | RESPIRATION RATE: 18 BRPM | DIASTOLIC BLOOD PRESSURE: 62 MMHG

## 2020-05-12 PROBLEM — F33.8 OTHER RECURRENT DEPRESSIVE DISORDERS (HCC): Status: ACTIVE | Noted: 2020-05-12

## 2020-05-12 PROCEDURE — 99239 HOSP IP/OBS DSCHRG MGMT >30: CPT | Performed by: PSYCHIATRY & NEUROLOGY

## 2020-05-12 RX ORDER — SERTRALINE HYDROCHLORIDE 100 MG/1
TABLET, FILM COATED ORAL
Qty: 46 TABLET | Refills: 0 | Status: SHIPPED | OUTPATIENT
Start: 2020-05-12

## 2020-05-12 RX ORDER — OLANZAPINE 20 MG/1
20 TABLET ORAL DAILY
Qty: 30 TABLET | Refills: 0 | Status: SHIPPED | OUTPATIENT
Start: 2020-05-12

## 2020-05-12 RX ORDER — LISINOPRIL 20 MG/1
20 TABLET ORAL DAILY
Qty: 30 TABLET | Refills: 0 | Status: SHIPPED | OUTPATIENT
Start: 2020-05-12 | End: 2023-01-26 | Stop reason: SDUPTHER

## 2020-05-12 RX ORDER — FLUPHENAZINE HYDROCHLORIDE 5 MG/1
5 TABLET ORAL DAILY
Qty: 30 TABLET | Refills: 0 | Status: SHIPPED | OUTPATIENT
Start: 2020-05-13 | End: 2023-01-26

## 2020-05-12 RX ORDER — METOPROLOL SUCCINATE 25 MG/1
25 TABLET, EXTENDED RELEASE ORAL
Qty: 30 TABLET | Refills: 0 | Status: SHIPPED | OUTPATIENT
Start: 2020-05-12 | End: 2023-03-24 | Stop reason: SDUPTHER

## 2020-05-12 RX ADMIN — LISINOPRIL 20 MG: 10 TABLET ORAL at 08:03

## 2020-05-12 RX ADMIN — SERTRALINE 150 MG: 50 TABLET, FILM COATED ORAL at 08:03

## 2020-05-12 RX ADMIN — FLUPHENAZINE HYDROCHLORIDE 5 MG: 5 TABLET, FILM COATED ORAL at 08:03

## 2020-05-12 RX ADMIN — OLANZAPINE 20 MG: 10 TABLET, FILM COATED ORAL at 08:03

## 2020-05-12 RX ADMIN — METOPROLOL SUCCINATE 25 MG: 25 TABLET, EXTENDED RELEASE ORAL at 08:03

## 2020-05-12 NOTE — DISCHARGE INSTR - APPOINTMENTS
Maliha Ibarra  [address] [place] [id number]  [phone]    You are accepted to [facility] today at 11 AM.

## 2020-05-12 NOTE — DISCHARGE SUMMARY
This patient visit is electronic.  The patient gave consent to be seen remotely, and when consent is given they understand that the consent allows for patient identifiable information to be sent to a third party as needed.   They may refuse to be seen remotely at any time. The electronic data is encrypted and password protected, and the patient has been advised of the potential risks to privacy not withstanding such measures.    Date of Discharge:  5/12/2020    Discharge Diagnosis:Principal Problem:    Schizophrenia, paranoid type (CMS/HCC)  Active Problems:    Hypertension    Other recurrent depressive disorders (CMS/HCC)        Presenting Problem/History of Present Illness:Patient was admitted to the hospital on May 6 having presented agitated depressed and paranoid at the [facility] emergency room, voluntarily transferred and admitted to [facility] for safety evaluation and treatment, see admission note for details.         Hospital Course:  Patient was admitted for safety and stabilization and was placed on standard precautions.  Routine labs were checked.  Patient was assigned a masters level therapist and provided with an opportunity to participate in group and individual therapy on the unit.  Patient seen on a daily basis for evaluation and supportive therapy.  Patient's psychotropic medication format evolved to combination of the Zyprexa, Zoloft and then Prolixin (the preferred Cariprazine had to be discontinued, a prescription not covered by the patient's insurance) at the time of discharge noting the patient's steady improvement throughout his hospital stay.  Patient had been continued on his lisinopril and metoprolol for hypertension.  Patient's lab done revealed normal hemoglobin A1c as well as lipids with the exception of a low HDL slightly elevated LDL.  At discharge patient free of any psychotic thought content and affect, although restricted, was without significant depression and certainly no  thoughts of harming self or others.  Patient was to be admitted to the [facility] program anticipating entering the [facility] residential facility.  This should prove to be very beneficial for him.  Patient's sister approving of the disposition plan and very supportive.  See below for medications.    Consults:   Consults     No orders found from 4/7/2020 to 5/7/2020.          Labs:  Lab Results (all)     Procedure Component Value Units Date/Time    Lipid Panel [[id number]]  (Abnormal) Collected:  05/08/20 0529    Specimen:  Blood Updated:  05/08/20 0557     Total Cholesterol 174 mg/dL      Triglycerides 114 mg/dL     Thank HDL Cholesterol 27 mg/dL      LDL Cholesterol  124 mg/dL      VLDL Cholesterol 22.8 mg/dL      LDL/HDL Ratio 4.60    Narrative:       Cholesterol Reference Ranges  (U.S. Department of Health and Human Services ATP III Classifications)    Desirable          <200 mg/dL  Borderline High    200-239 mg/dL  High Risk          >240 mg/dL      Triglyceride Reference Ranges  (U.S. Department of Health and Human Services ATP III Classifications)    Normal           <150 mg/dL  Borderline High  150-199 mg/dL  High             200-499 mg/dL  Very High        >500 mg/dL    HDL Reference Ranges  (U.S. Department of Health and Human Services ATP III Classifcations)    Low     <40 mg/dl (major risk factor for CHD)  High    >60 mg/dl ('negative' risk factor for CHD)        LDL Reference Ranges  (U.S. Department of Health and Human Services ATP III Classifcations)    Optimal          <100 mg/dL  Near Optimal     100-129 mg/dL  Borderline High  130-159 mg/dL  High             160-189 mg/dL  Very High        >189 mg/dL    Basic Metabolic Panel [[id number]]  (Abnormal) Collected:  05/08/20 0529    Specimen:  Blood Updated:  05/08/20 0557     Glucose 93 mg/dL      BUN 21 mg/dL      Creatinine 0.79 mg/dL      Sodium 141 mmol/L      Potassium 4.1 mmol/L      Chloride 106 mmol/L      CO2 26.2 mmol/L      Calcium 8.7  mg/dL      eGFR Non African Amer 101 mL/min/1.73      BUN/Creatinine Ratio 26.6     Anion Gap 8.8 mmol/L     Narrative:       GFR Normal >60  Chronic Kidney Disease <60  Kidney Failure <15      Hemoglobin A1c [[id number]]  (Normal) Collected:  05/08/20 0529    Specimen:  Blood Updated:  05/08/20 0548     Hemoglobin A1C 5.40 %     Narrative:       Hemoglobin A1C Ranges:    Increased Risk for Diabetes  5.7% to 6.4%  Diabetes                     >= 6.5%  Diabetic Goal                < 7.0%          Imaging:  Imaging Results (All)     None          Condition on Discharge:  improved    Prognosis: Fair    Vital Signs  Temp:  [97 °F (36.1 °C)-97.8 °F (36.6 °C)] 97 °F (36.1 °C)  Heart Rate:  [81-90] 81  Resp:  [18] 18  BP: (104-142)/() 104/62         Discharge Medications      ASK your doctor about these medications      Instructions Start Date        lisinopril 20 MG tablet  Commonly known as:  PRINIVIL,ZESTRIL   20 mg, Oral, Daily      metoprolol succinate XL 25 MG 24 hr tablet  Commonly known as:  TOPROL-XL   25 mg, Oral, Every 24 Hours Scheduled      sertraline 100 MG tablet  Commonly known as:  ZOLOFT  Ask about: Which instructions should I use?   150 mg daily      ZyPREXA 20 MG tablet  Generic drug:  OLANZapine  Ask about: Which instructions should I use?   20 mg, Oral, Daily      Prolixin 5 mg daily       Discharge Diet: Regular    Activity at Discharge: No restrictions    Follow-up Appointments: Patient to enter the [facility] program day of discharge.  See hospital course above.          Tye Garcia MD  05/12/20  08:49  Time spent with the discharge process >30 minutes.     Dictated utilizing Dragon dictation

## 2020-05-12 NOTE — PLAN OF CARE
"  Problem: Patient Care Overview  Goal: Interprofessional Rounds/Family Conf  5/12/2020 0936 by Jessi Reyes  Outcome: Outcome(s) achieved  Flowsheets (Taken 5/12/2020 0936)  Participants: psychiatrist; social work; milieu/psych techs; nursing  Summary: Treatment team staffing with Dr. Garcia and GUDELIA Multani.     Data:     Therapist met with patient for individual session.  Continued to discuss progress and treatment goals.  Educated patient about importance of safety and aftercare plans.  Reviewed patient's chart and staffed with treatment team.    Therapist spoke with Rayo at [facility] who reports patient will likely be admitted there upon completing [facility].  He reported he will meet with patient in person at [facility].    Therapist spoke with Anita at [facility] who stated that patient is accepted for today with 11 AM .    Patient discussed feeling better today and less depressed.  He denied any violent thoughts and denied hallucinations.  Patient reported feeling hopeful for the future.  He reported medications to be helpful.  He reported being agreeable to attend [facility] upon discharge today and then to attend [facility].  Patient denied concerns.    Therapist spoke with patient's sister Laura via phone for update.  She appeared supportive of patient's treatment and agreeable for patient's disposition plan.  She reported she will assist patient in getting a hotel again if ever needed in the future.    Patient educated on maintaining social distancing and frequent hand washing during COVID-19 pandemic.    Assessment:    Patient is observed to display less restricted affect and \"okay\" mood.  Patient denied SI, HI, and AVH.  He appeared oriented x3 with limited insight.  Patient reported improved sleep and fair appetite.    Plan:    Therapist will continue to assist daily with aftercare and safety planning as needed.  Patient accepted to [facility] today at 11 AM.  Patient then to " enter [facility] upon completing [facility].

## 2022-06-01 ENCOUNTER — TRANSCRIBE ORDERS (OUTPATIENT)
Dept: ADMINISTRATIVE | Facility: HOSPITAL | Age: 57
End: 2022-06-01

## 2022-06-01 ENCOUNTER — HOSPITAL ENCOUNTER (OUTPATIENT)
Dept: GENERAL RADIOLOGY | Facility: HOSPITAL | Age: 57
Discharge: HOME | End: 2022-06-01
Admitting: NURSE PRACTITIONER

## 2022-06-01 DIAGNOSIS — M54.50 LOW BACK PAIN, UNSPECIFIED BACK PAIN LATERALITY, UNSPECIFIED CHRONICITY, UNSPECIFIED WHETHER SCIATICA PRESENT: ICD-10-CM

## 2022-06-01 DIAGNOSIS — M54.50 LOW BACK PAIN, UNSPECIFIED BACK PAIN LATERALITY, UNSPECIFIED CHRONICITY, UNSPECIFIED WHETHER SCIATICA PRESENT: Primary | ICD-10-CM

## 2022-06-01 PROCEDURE — 72110 X-RAY EXAM L-2 SPINE 4/>VWS: CPT

## 2022-06-01 PROCEDURE — 72110 X-RAY EXAM L-2 SPINE 4/>VWS: CPT | Performed by: RADIOLOGY

## 2023-01-26 ENCOUNTER — OFFICE VISIT (OUTPATIENT)
Dept: FAMILY MEDICINE CLINIC | Facility: CLINIC | Age: 58
End: 2023-01-26
Payer: MEDICARE

## 2023-01-26 VITALS
BODY MASS INDEX: 38.8 KG/M2 | HEART RATE: 102 BPM | SYSTOLIC BLOOD PRESSURE: 102 MMHG | OXYGEN SATURATION: 95 % | WEIGHT: 247.2 LBS | TEMPERATURE: 97.7 F | HEIGHT: 67 IN | DIASTOLIC BLOOD PRESSURE: 70 MMHG

## 2023-01-26 DIAGNOSIS — Z12.5 SCREENING PSA (PROSTATE SPECIFIC ANTIGEN): ICD-10-CM

## 2023-01-26 DIAGNOSIS — I10 ESSENTIAL HYPERTENSION: Primary | ICD-10-CM

## 2023-01-26 DIAGNOSIS — E78.2 MIXED HYPERLIPIDEMIA: ICD-10-CM

## 2023-01-26 DIAGNOSIS — E16.2 HYPOGLYCEMIA: ICD-10-CM

## 2023-01-26 PROCEDURE — 99214 OFFICE O/P EST MOD 30 MIN: CPT | Performed by: FAMILY MEDICINE

## 2023-01-26 RX ORDER — ATORVASTATIN CALCIUM 40 MG/1
TABLET, FILM COATED ORAL
COMMUNITY
Start: 2022-12-22 | End: 2023-01-26 | Stop reason: SDUPTHER

## 2023-01-26 RX ORDER — ONDANSETRON 4 MG/1
4 TABLET, FILM COATED ORAL EVERY 8 HOURS PRN
COMMUNITY

## 2023-01-26 RX ORDER — ATORVASTATIN CALCIUM 40 MG/1
40 TABLET, FILM COATED ORAL DAILY
Qty: 90 TABLET | Refills: 0 | Status: SHIPPED | OUTPATIENT
Start: 2023-01-26

## 2023-01-26 RX ORDER — AMOXICILLIN 875 MG/1
875 TABLET, COATED ORAL 2 TIMES DAILY
COMMUNITY

## 2023-01-26 RX ORDER — LURASIDONE HYDROCHLORIDE 60 MG/1
60 TABLET, FILM COATED ORAL DAILY
COMMUNITY

## 2023-01-26 RX ORDER — BENZONATATE 100 MG/1
CAPSULE ORAL
COMMUNITY
Start: 2022-11-10

## 2023-01-26 RX ORDER — AMITRIPTYLINE HYDROCHLORIDE 75 MG/1
TABLET, FILM COATED ORAL
COMMUNITY
Start: 2023-01-09

## 2023-01-26 RX ORDER — IBUPROFEN 800 MG/1
800 TABLET ORAL EVERY 6 HOURS PRN
COMMUNITY

## 2023-01-26 RX ORDER — LISINOPRIL 20 MG/1
20 TABLET ORAL DAILY
Qty: 90 TABLET | Refills: 0 | Status: SHIPPED | OUTPATIENT
Start: 2023-01-26

## 2023-01-26 NOTE — PROGRESS NOTES
"Chief Complaint  Establish Care and Hypertension    Subjective          Jose Taylor presents to [facility]  Hypertension  This is a chronic problem. The current episode started more than 1 year ago. The problem is controlled. Current antihypertension treatment includes ACE inhibitors. The current treatment provides significant improvement.   Hyperlipidemia  This is a chronic problem. The current episode started more than 1 year ago. The problem is controlled. Current antihyperlipidemic treatment includes statins. The current treatment provides significant improvement of lipids. Risk factors for coronary artery disease include post-menopausal, a sedentary lifestyle, obesity, male sex and dyslipidemia.       Review of Systems      Objective   Vital Signs:   /70 (BP Location: Right arm, Patient Position: Sitting)   Pulse 102   Temp 97.7 °F (36.5 °C) (Temporal)   Ht 170.2 cm (67\")   Wt 112 kg (247 lb 3.2 oz)   SpO2 95%   BMI 38.72 kg/m²     Physical Exam  Constitutional:       General: He is not in acute distress.     Appearance: Normal appearance. He is well-developed and well-groomed. He is not ill-appearing, toxic-appearing or diaphoretic.   HENT:      Head: Normocephalic.      Nose: Nose normal. No congestion or rhinorrhea.      Mouth/Throat:      Mouth: Mucous membranes are moist.      Pharynx: Oropharynx is clear. No oropharyngeal exudate or posterior oropharyngeal erythema.   Eyes:      General: Lids are normal.         Right eye: No discharge.         Left eye: No discharge.      Extraocular Movements: Extraocular movements intact.      Pupils: Pupils are equal, round, and reactive to light.   Neck:      Vascular: No carotid bruit.   Cardiovascular:      Rate and Rhythm: Normal rate and regular rhythm.      Pulses: Normal pulses.      Heart sounds: Normal heart sounds. No murmur heard.    No friction rub. No gallop.   Pulmonary:      Effort: Pulmonary effort is " normal. No respiratory distress.      Breath sounds: Normal breath sounds. No stridor. No wheezing, rhonchi or rales.   Chest:      Chest wall: No tenderness.   Abdominal:      General: Bowel sounds are normal. There is no distension.      Palpations: Abdomen is soft. There is no mass.      Tenderness: There is no abdominal tenderness. There is no right CVA tenderness, left CVA tenderness, guarding or rebound.      Hernia: No hernia is present.   Musculoskeletal:         General: No swelling or tenderness. Normal range of motion.      Cervical back: Normal range of motion and neck supple. No rigidity or tenderness.      Right lower leg: No edema.      Left lower leg: No edema.   Lymphadenopathy:      Cervical: No cervical adenopathy.   Skin:     General: Skin is warm.      Capillary Refill: Capillary refill takes less than 2 seconds.      Coloration: Skin is not jaundiced.      Findings: No bruising, erythema or rash.   Neurological:      General: No focal deficit present.      Mental Status: He is alert and oriented to person, place, and time.      Motor: Motor function is intact. No weakness.      Coordination: Coordination is intact.      Gait: Gait is intact. Gait normal.   Psychiatric:         Attention and Perception: Attention normal.         Mood and Affect: Mood normal.         Speech: Speech normal.         Behavior: Behavior normal.         Cognition and Memory: Cognition normal.         Judgment: Judgment normal.        Result Review :                 Assessment and Plan    Diagnoses and all orders for this visit:    1. Essential hypertension (Primary)  -     CBC Auto Differential; Future  -     Comprehensive Metabolic Panel; Future  -     Hemoglobin A1c; Future  -     Lipid Panel; Future  -     T4, Free; Future  -     TSH; Future  -     lisinopril (PRINIVIL,ZESTRIL) 20 MG tablet; Take 1 tablet by mouth Daily. Indications: High Blood Pressure Disorder  Dispense: 90 tablet; Refill: 0    2. Hypoglycemia  -      Hemoglobin A1c; Future    3. Screening PSA (prostate specific antigen)  -     PSA Screen; Future    4. Mixed hyperlipidemia  -     atorvastatin (LIPITOR) 40 MG tablet; Take 1 tablet by mouth Daily.  Dispense: 90 tablet; Refill: 0      Patient's Body mass index is 38.72 kg/m². indicating that he is obese (BMI >30). Obesity-related health conditions include the following: hypertension and dyslipidemias. Obesity is unchanged. BMI is is above average; BMI management plan is completed. We discussed low calorie, low carb based diet program, portion control and increasing exercise..    Follow Up   Return in about 3 months (around 4/26/2023), or if symptoms worsen or fail to improve.  Patient was given instructions and counseling regarding his condition or for health maintenance advice. Please see specific information pulled into the AVS if appropriate.     This document has been electronically signed by JUAN Saenz  January 26, 2023 14:18 EST

## 2023-02-10 ENCOUNTER — TELEPHONE (OUTPATIENT)
Dept: FAMILY MEDICINE CLINIC | Facility: CLINIC | Age: 58
End: 2023-02-10
Payer: MEDICARE

## 2023-02-15 ENCOUNTER — TELEPHONE (OUTPATIENT)
Dept: FAMILY MEDICINE CLINIC | Facility: CLINIC | Age: 58
End: 2023-02-15
Payer: MEDICARE

## 2023-02-15 NOTE — TELEPHONE ENCOUNTER
Called patient in regards to lab work. His care giver took a message and said he will let him know.

## 2023-02-23 ENCOUNTER — LAB (OUTPATIENT)
Dept: FAMILY MEDICINE CLINIC | Facility: CLINIC | Age: 58
End: 2023-02-23
Payer: MEDICARE

## 2023-02-23 DIAGNOSIS — E78.2 MIXED HYPERLIPIDEMIA: ICD-10-CM

## 2023-02-23 DIAGNOSIS — E16.2 HYPOGLYCEMIA: ICD-10-CM

## 2023-02-23 DIAGNOSIS — Z12.5 SCREENING PSA (PROSTATE SPECIFIC ANTIGEN): Primary | ICD-10-CM

## 2023-02-23 DIAGNOSIS — I10 ESSENTIAL HYPERTENSION: ICD-10-CM

## 2023-02-24 LAB
ALBUMIN SERPL-MCNC: 4.2 G/DL (ref 3.5–5.2)
ALBUMIN/GLOB SERPL: 1.6 G/DL
ALP SERPL-CCNC: 79 U/L (ref 39–117)
ALT SERPL-CCNC: 30 U/L (ref 1–41)
AST SERPL-CCNC: 35 U/L (ref 1–40)
BASOPHILS # BLD AUTO: 0.05 10*3/MM3 (ref 0–0.2)
BASOPHILS NFR BLD AUTO: 0.6 % (ref 0–1.5)
BILIRUB SERPL-MCNC: 0.4 MG/DL (ref 0–1.2)
BUN SERPL-MCNC: 19 MG/DL (ref 6–20)
BUN/CREAT SERPL: 21.6 (ref 7–25)
CALCIUM SERPL-MCNC: 8.9 MG/DL (ref 8.6–10.5)
CHLORIDE SERPL-SCNC: 104 MMOL/L (ref 98–107)
CHOLEST SERPL-MCNC: 162 MG/DL (ref 0–200)
CO2 SERPL-SCNC: 27.7 MMOL/L (ref 22–29)
CREAT SERPL-MCNC: 0.88 MG/DL (ref 0.76–1.27)
EGFRCR SERPLBLD CKD-EPI 2021: 99.1 ML/MIN/1.73
EOSINOPHIL # BLD AUTO: 0.33 10*3/MM3 (ref 0–0.4)
EOSINOPHIL NFR BLD AUTO: 4.2 % (ref 0.3–6.2)
ERYTHROCYTE [DISTWIDTH] IN BLOOD BY AUTOMATED COUNT: 13.3 % (ref 12.3–15.4)
GLOBULIN SER CALC-MCNC: 2.7 GM/DL
GLUCOSE SERPL-MCNC: 107 MG/DL (ref 65–99)
HBA1C MFR BLD: 5.5 % (ref 4.8–5.6)
HCT VFR BLD AUTO: 45.5 % (ref 37.5–51)
HDLC SERPL-MCNC: 34 MG/DL (ref 40–60)
HGB BLD-MCNC: 14.5 G/DL (ref 13–17.7)
IMM GRANULOCYTES # BLD AUTO: 0.02 10*3/MM3 (ref 0–0.05)
IMM GRANULOCYTES NFR BLD AUTO: 0.3 % (ref 0–0.5)
IMP & REVIEW OF LAB RESULTS: NORMAL
LDLC SERPL CALC-MCNC: 108 MG/DL (ref 0–100)
LYMPHOCYTES # BLD AUTO: 2.4 10*3/MM3 (ref 0.7–3.1)
LYMPHOCYTES NFR BLD AUTO: 30.7 % (ref 19.6–45.3)
MCH RBC QN AUTO: 29.7 PG (ref 26.6–33)
MCHC RBC AUTO-ENTMCNC: 31.9 G/DL (ref 31.5–35.7)
MCV RBC AUTO: 93 FL (ref 79–97)
MONOCYTES # BLD AUTO: 0.92 10*3/MM3 (ref 0.1–0.9)
MONOCYTES NFR BLD AUTO: 11.7 % (ref 5–12)
NEUTROPHILS # BLD AUTO: 4.11 10*3/MM3 (ref 1.7–7)
NEUTROPHILS NFR BLD AUTO: 52.5 % (ref 42.7–76)
NRBC BLD AUTO-RTO: 0 /100 WBC (ref 0–0.2)
PLATELET # BLD AUTO: 328 10*3/MM3 (ref 140–450)
POTASSIUM SERPL-SCNC: 4.6 MMOL/L (ref 3.5–5.2)
PROT SERPL-MCNC: 6.9 G/DL (ref 6–8.5)
PSA SERPL-MCNC: 0.91 NG/ML (ref 0–4)
RBC # BLD AUTO: 4.89 10*6/MM3 (ref 4.14–5.8)
SODIUM SERPL-SCNC: 139 MMOL/L (ref 136–145)
T4 FREE SERPL-MCNC: 0.92 NG/DL (ref 0.93–1.7)
TRIGL SERPL-MCNC: 107 MG/DL (ref 0–150)
TSH SERPL DL<=0.005 MIU/L-ACNC: 2.35 UIU/ML (ref 0.27–4.2)
VLDLC SERPL CALC-MCNC: 20 MG/DL (ref 5–40)
WBC # BLD AUTO: 7.83 10*3/MM3 (ref 3.4–10.8)

## 2023-02-27 ENCOUNTER — TELEPHONE (OUTPATIENT)
Dept: FAMILY MEDICINE CLINIC | Facility: CLINIC | Age: 58
End: 2023-02-27
Payer: MEDICARE

## 2023-02-27 NOTE — TELEPHONE ENCOUNTER
----- Message from JUAN Saenz sent at 2/27/2023  9:11 AM EST -----  Please let patient know results are normal. Thank you.     Letter mailed.

## 2023-03-24 DIAGNOSIS — I10 ESSENTIAL HYPERTENSION: ICD-10-CM

## 2023-03-24 RX ORDER — METOPROLOL SUCCINATE 25 MG/1
25 TABLET, EXTENDED RELEASE ORAL
Qty: 30 TABLET | Refills: 0 | Status: SHIPPED | OUTPATIENT
Start: 2023-03-24

## 2023-03-24 NOTE — TELEPHONE ENCOUNTER
Caller: [place]    Relationship: Other    Best call back number:[phone]    Requested Prescriptions:   Requested Prescriptions     Pending Prescriptions Disp Refills   • metoprolol succinate XL (TOPROL-XL) 25 MG 24 hr tablet 30 tablet 0     Sig: Take 1 tablet by mouth Daily. Indications: High Blood Pressure Disorder        Pharmacy where request should be sent: [facility] - [place], [place] - [address] - [phone]  - [phone] FX     Last office visit with prescribing clinician: 1/26/2023   Last telemedicine visit with prescribing clinician: 4/26/2023   Next office visit with prescribing clinician: 4/26/2023     Additional details provided by patient:     Does the patient have less than a 3 day supply:  [x] Yes  [] No    Would you like a call back once the refill request has been completed: [x] Yes [] No    If the office needs to give you a call back, can they leave a voicemail: [] Yes [x] No    Cinthia Jackson Rep   03/24/23 14:37 EDT

## 2023-04-03 ENCOUNTER — LAB (OUTPATIENT)
Dept: FAMILY MEDICINE CLINIC | Facility: CLINIC | Age: 58
End: 2023-04-03
Payer: MEDICARE

## 2023-04-03 DIAGNOSIS — F20.9 CHRONIC SCHIZOPHRENIA: Primary | ICD-10-CM

## 2023-04-03 PROCEDURE — 84146 ASSAY OF PROLACTIN: CPT | Performed by: NURSE PRACTITIONER

## 2023-04-05 LAB — PROLACTIN SERPL-MCNC: 11.4 NG/ML (ref 4–15.2)

## 2023-04-19 DIAGNOSIS — I10 ESSENTIAL HYPERTENSION: ICD-10-CM

## 2023-04-19 RX ORDER — METOPROLOL SUCCINATE 25 MG/1
TABLET, EXTENDED RELEASE ORAL
Qty: 30 TABLET | Refills: 0 | Status: SHIPPED | OUTPATIENT
Start: 2023-04-19

## 2023-04-25 DIAGNOSIS — E78.2 MIXED HYPERLIPIDEMIA: ICD-10-CM

## 2023-04-25 RX ORDER — ATORVASTATIN CALCIUM 40 MG/1
TABLET, FILM COATED ORAL
Qty: 90 TABLET | Refills: 0 | Status: SHIPPED | OUTPATIENT
Start: 2023-04-25

## 2023-05-05 DIAGNOSIS — I10 ESSENTIAL HYPERTENSION: ICD-10-CM

## 2023-05-05 RX ORDER — LISINOPRIL 20 MG/1
20 TABLET ORAL DAILY
Qty: 30 TABLET | Refills: 0 | Status: SHIPPED | OUTPATIENT
Start: 2023-05-05

## 2023-05-09 ENCOUNTER — OFFICE VISIT (OUTPATIENT)
Dept: FAMILY MEDICINE CLINIC | Facility: CLINIC | Age: 58
End: 2023-05-09
Payer: MEDICARE

## 2023-05-09 VITALS
HEIGHT: 67 IN | HEART RATE: 111 BPM | DIASTOLIC BLOOD PRESSURE: 80 MMHG | OXYGEN SATURATION: 98 % | BODY MASS INDEX: 38.64 KG/M2 | WEIGHT: 246.2 LBS | TEMPERATURE: 98 F | SYSTOLIC BLOOD PRESSURE: 120 MMHG

## 2023-05-09 DIAGNOSIS — Z00.00 MEDICARE ANNUAL WELLNESS VISIT, SUBSEQUENT: Primary | ICD-10-CM

## 2023-05-09 RX ORDER — ARIPIPRAZOLE 10 MG/1
30 TABLET ORAL DAILY
COMMUNITY

## 2023-05-09 NOTE — PROGRESS NOTES
Annual Exam      HEALTH RISK ASSESSMENT    1965    Recent Hospitalizations:  No hospitalization(s) within the last year..        Current Medical Providers:  Patient Care Team:  Juliet Dyer APRN as PCP - General (Nurse Practitioner)        Smoking Status:  Social History     Tobacco Use   Smoking Status Never   Smokeless Tobacco Never       Alcohol Consumption:  Social History     Substance and Sexual Activity   Alcohol Use No       Depression Screen:       2023    11:07 AM   PHQ-2/PHQ-9 Depression Screening   Little Interest or Pleasure in Doing Things 0-->not at all   Feeling Down, Depressed or Hopeless 3-->nearly every day   Trouble Falling or Staying Asleep, or Sleeping Too Much 0-->not at all   Feeling Tired or Having Little Energy 1-->several days   Poor Appetite or Overeating 3-->nearly every day   Feeling Bad about Yourself - or that You are a Failure or Have Let Yourself or Your Family Down 0-->not at all   Trouble Concentrating on Things, Such as Reading the Newspaper or Watching Television 3-->nearly every day   Moving or Speaking So Slowly that Other People Could Have Noticed? Or the Opposite - Being So Fidgety 1-->several days   Thoughts that You Would be Better Off Dead or of Hurting Yourself in Some Way 0-->not at all   PHQ-9: Brief Depression Severity Measure Score 11   If You Checked Off Any Problems, How Difficult Have These Problems Made It For You to Do Your Work, Take Care of Things at Home, or Get Along with Other People? not difficult at all       Health Habits and Functional and Cognitive Screenin/9/2023    11:05 AM   Functional & Cognitive Status   Do you have difficulty preparing food and eating? No   Do you have difficulty bathing yourself, getting dressed or grooming yourself? No   Do you have difficulty using the toilet? No   Do you have difficulty moving around from place to place? No   Do you have trouble with steps or getting out of a bed or a chair? No    Current Diet Well Balanced Diet   Dental Exam Up to date   Eye Exam Up to date   Exercise (times per week) 3 times per week   Current Exercises Include Walking   Do you need help using the phone?  No   Are you deaf or do you have serious difficulty hearing?  No   Do you need help with transportation? No   Do you need help shopping? Yes   Do you need help preparing meals?  No   Do you need help with housework?  No   Do you need help with laundry? No   Do you need help taking your medications? Yes   Do you need help managing money? No   Do you ever drive or ride in a car without wearing a seat belt? No           Does the patient have evidence of cognitive impairment? No    Aspirin use counseling: Taking ASA appropriately as indicated      Recent Lab Results:  CMP:  Lab Results   Component Value Date    BUN 19 02/23/2023    CREATININE 0.88 02/23/2023    EGFRIFNONA 101 05/08/2020    BCR 21.6 02/23/2023     02/23/2023    K 4.6 02/23/2023    CO2 27.7 02/23/2023    CALCIUM 8.9 02/23/2023    PROTENTOTREF 6.9 02/23/2023    ALBUMIN 4.2 02/23/2023    LABGLOBREF 2.7 02/23/2023    LABIL2 1.6 02/23/2023    BILITOT 0.4 02/23/2023    ALKPHOS 79 02/23/2023    AST 35 02/23/2023    ALT 30 02/23/2023     Lipid Panel:  Lab Results   Component Value Date    CHOL 174 05/08/2020    TRIG 107 02/23/2023    HDL 34 (L) 02/23/2023    VLDL 20 02/23/2023    LDLHDL 4.60 05/08/2020     HbA1c:  Lab Results   Component Value Date    HGBA1C 5.50 02/23/2023       Visual Acuity:  Vision Screening    Right eye Left eye Both eyes   Without correction 20 30 20/30 20/30   With correction          Age-appropriate Screening Schedule:  Refer to the list below for future screening recommendations based on patient's age, sex and/or medical conditions. Orders for these recommended tests are listed in the plan section. The patient has been provided with a written plan.    Health Maintenance   Topic Date Due   • COLORECTAL CANCER SCREENING  Never done   •  ZOSTER VACCINE (1 of 2) Never done   • TDAP/TD VACCINES (2 - Tdap) 05/25/2021   • COVID-19 Vaccine (4 - Booster) 02/08/2022   • INFLUENZA VACCINE  08/01/2023   • LIPID PANEL  02/23/2024   • ANNUAL WELLNESS VISIT  05/09/2024   • HEPATITIS C SCREENING  Completed   • Pneumococcal Vaccine 0-64  Aged Out        Subjective   History of Present Illness    Jose Taylor is a 57 y.o. male who presents for an annual physical exam.    The following portions of the patient's history were reviewed and updated as appropriate: allergies, current medications, past family history, past medical history, past social history, past surgical history and problem list.    Outpatient Medications Prior to Visit   Medication Sig Dispense Refill   • amitriptyline (ELAVIL) 75 MG tablet      • ARIPiprazole (ABILIFY) 10 MG tablet Take 3 tablets by mouth Daily.     • Aspirin 81 MG capsule Take  by mouth.     • atorvastatin (LIPITOR) 40 MG tablet TAKE 1 TABLET BY MOUTH DAILY. FOR CHOLESTEROL 90 tablet 0   • benzonatate (TESSALON) 100 MG capsule      • ibuprofen (ADVIL,MOTRIN) 800 MG tablet Take 1 tablet by mouth Every 6 (Six) Hours As Needed for Mild Pain.     • lisinopril (PRINIVIL,ZESTRIL) 20 MG tablet TAKE 1 TABLET BY MOUTH DAILY. INDICATIONS: HIGH BLOOD PRESSURE DISORDER 30 tablet 0   • lurasidone HCl (LATUDA) 60 MG tablet tablet Take 1 tablet by mouth Daily.     • metoprolol succinate XL (TOPROL-XL) 25 MG 24 hr tablet TAKE 1 TABLET BY MOUTH DAILY 30 tablet 0   • OLANZapine (ZyPREXA) 20 MG tablet Take 1 tablet by mouth Daily. Indications: Schizophrenia 30 tablet 0   • ondansetron (ZOFRAN) 4 MG tablet Take 1 tablet by mouth Every 8 (Eight) Hours As Needed for Nausea or Vomiting.     • sertraline (Zoloft) 100 MG tablet Take 1 & 1/2 tablets by mouth daily.  Indications: depression 46 tablet 0   • amoxicillin (AMOXIL) 875 MG tablet Take 875 mg by mouth 2 (Two) Times a Day. (Patient not taking: Reported on 5/9/2023)       No facility-administered  medications prior to visit.       Patient Active Problem List   Diagnosis   • Hypertension   • Schizophrenia, paranoid type   • Other recurrent depressive disorders       Advance Care Planning:  ACP discussion was declined by the patient. Patient does not have an advance directive, declines further assistance.    Identification of Risk Factors:  Risk factors include: Obesity/Overweight .    Review of Systems    Compared to one year ago, the patient feels his physical health is worse.  Compared to one year ago, the patient feels his mental health is worse.    Objective     Physical Exam  Constitutional:       General: He is not in acute distress.     Appearance: Normal appearance. He is well-developed and well-groomed. He is not ill-appearing, toxic-appearing or diaphoretic.   HENT:      Head: Normocephalic.      Nose: Nose normal. No congestion or rhinorrhea.      Mouth/Throat:      Mouth: Mucous membranes are moist.      Pharynx: Oropharynx is clear. No oropharyngeal exudate or posterior oropharyngeal erythema.   Eyes:      General: Lids are normal.         Right eye: No discharge.         Left eye: No discharge.      Extraocular Movements: Extraocular movements intact.      Pupils: Pupils are equal, round, and reactive to light.   Neck:      Vascular: No carotid bruit.   Cardiovascular:      Rate and Rhythm: Normal rate and regular rhythm.      Pulses: Normal pulses.      Heart sounds: Normal heart sounds. No murmur heard.    No friction rub. No gallop.   Pulmonary:      Effort: Pulmonary effort is normal. No respiratory distress.      Breath sounds: Normal breath sounds. No stridor. No wheezing, rhonchi or rales.   Chest:      Chest wall: No tenderness.   Abdominal:      General: Bowel sounds are normal. There is no distension.      Palpations: Abdomen is soft. There is no mass.      Tenderness: There is no abdominal tenderness. There is no right CVA tenderness, left CVA tenderness, guarding or rebound.       "Hernia: No hernia is present.   Musculoskeletal:         General: No swelling or tenderness. Normal range of motion.      Cervical back: Normal range of motion and neck supple. No rigidity or tenderness.      Right lower leg: No edema.      Left lower leg: No edema.   Lymphadenopathy:      Cervical: No cervical adenopathy.   Skin:     General: Skin is warm.      Capillary Refill: Capillary refill takes less than 2 seconds.      Coloration: Skin is not jaundiced.      Findings: No bruising, erythema or rash.   Neurological:      General: No focal deficit present.      Mental Status: He is alert and oriented to person, place, and time.      Motor: Motor function is intact. No weakness.      Coordination: Coordination is intact.      Gait: Gait is intact. Gait normal.   Psychiatric:         Attention and Perception: Attention normal.         Mood and Affect: Mood normal.         Speech: Speech normal.         Behavior: Behavior normal.         Cognition and Memory: Cognition normal.         Judgment: Judgment normal.         Vitals:    05/09/23 1059   BP: 120/80   BP Location: Right arm   Patient Position: Sitting   Cuff Size: Large Adult   Pulse: 111   Temp: 98 °F (36.7 °C)   TempSrc: Temporal   SpO2: 98%   Weight: 112 kg (246 lb 3.2 oz)   Height: 170.2 cm (67\")       Class 2 Severe Obesity (BMI >=35 and <=39.9). Obesity-related health conditions include the following: hypertension and dyslipidemias. Obesity is unchanged. BMI is is above average; BMI management plan is completed. We discussed low calorie, low carb based diet program, portion control and increasing exercise.      Assessment & Plan   Patient Self-Management and Personalized Health Advice  The patient has been provided with information about: diet, exercise and weight management and preventive services including:   · Annual Wellness Visit (AWV).    Visit Diagnoses:    ICD-10-CM ICD-9-CM   1. Medicare annual wellness visit, subsequent  Z00.00 V70.0 "       No orders of the defined types were placed in this encounter.      Outpatient Encounter Medications as of 5/9/2023   Medication Sig Dispense Refill   • amitriptyline (ELAVIL) 75 MG tablet      • ARIPiprazole (ABILIFY) 10 MG tablet Take 3 tablets by mouth Daily.     • Aspirin 81 MG capsule Take  by mouth.     • atorvastatin (LIPITOR) 40 MG tablet TAKE 1 TABLET BY MOUTH DAILY. FOR CHOLESTEROL 90 tablet 0   • benzonatate (TESSALON) 100 MG capsule      • ibuprofen (ADVIL,MOTRIN) 800 MG tablet Take 1 tablet by mouth Every 6 (Six) Hours As Needed for Mild Pain.     • lisinopril (PRINIVIL,ZESTRIL) 20 MG tablet TAKE 1 TABLET BY MOUTH DAILY. INDICATIONS: HIGH BLOOD PRESSURE DISORDER 30 tablet 0   • lurasidone HCl (LATUDA) 60 MG tablet tablet Take 1 tablet by mouth Daily.     • metoprolol succinate XL (TOPROL-XL) 25 MG 24 hr tablet TAKE 1 TABLET BY MOUTH DAILY 30 tablet 0   • OLANZapine (ZyPREXA) 20 MG tablet Take 1 tablet by mouth Daily. Indications: Schizophrenia 30 tablet 0   • ondansetron (ZOFRAN) 4 MG tablet Take 1 tablet by mouth Every 8 (Eight) Hours As Needed for Nausea or Vomiting.     • sertraline (Zoloft) 100 MG tablet Take 1 & 1/2 tablets by mouth daily.  Indications: depression 46 tablet 0   • [DISCONTINUED] amoxicillin (AMOXIL) 875 MG tablet Take 875 mg by mouth 2 (Two) Times a Day. (Patient not taking: Reported on 5/9/2023)       No facility-administered encounter medications on file as of 5/9/2023.       Reviewed use of high risk medication in the elderly: not applicable  Reviewed for potential of harmful drug interactions in the elderly: yes    Patient's Body mass index is 38.56 kg/m². indicating that he is obese (BMI >30). Obesity-related health conditions include the following: hypertension and dyslipidemias. Obesity is unchanged. BMI is is above average; BMI management plan is completed. We discussed low calorie, low carb based diet program, portion control and increasing exercise..    Follow  Up:  Return in about 3 months (around 8/9/2023), or if symptoms worsen or fail to improve.     An After Visit Summary and PPPS with all of these plans were given to the patient.             This document has been electronically signed by JUAN Saenz  May 9, 2023 14:08 EDT     Part of this note may be an electronic transcription/translation of spoken language to printed text using the Dragon Dictation System.

## 2023-05-19 DIAGNOSIS — I10 ESSENTIAL HYPERTENSION: ICD-10-CM

## 2023-05-19 RX ORDER — METOPROLOL SUCCINATE 25 MG/1
TABLET, EXTENDED RELEASE ORAL
Qty: 30 TABLET | Refills: 2 | Status: SHIPPED | OUTPATIENT
Start: 2023-05-19

## 2023-06-02 DIAGNOSIS — I10 ESSENTIAL HYPERTENSION: ICD-10-CM

## 2023-06-02 RX ORDER — LISINOPRIL 20 MG/1
20 TABLET ORAL DAILY
Qty: 30 TABLET | Refills: 2 | Status: SHIPPED | OUTPATIENT
Start: 2023-06-02

## 2023-06-09 NOTE — TELEPHONE ENCOUNTER
Caller: JOSEF    Relationship: DIRECTOR AT THE HOME WHERE HE LIVES    Best call back number: [phone]    Requested Prescriptions:   Requested Prescriptions     Pending Prescriptions Disp Refills    Aspirin 81 MG capsule       Sig: Take  by mouth.        Pharmacy where request should be sent: [facility] - [place], [place] - [address] - [phone]  - [phone] FX     Last office visit with prescribing clinician: 5/9/2023   Last telemedicine visit with prescribing clinician: Visit date not found   Next office visit with prescribing clinician: 8/9/2023     Additional details provided by patient: PATIENT IS OUT OF MEDICATION    Does the patient have less than a 3 day supply:  [x] Yes  [] No    Would you like a call back once the refill request has been completed: [] Yes [x] No    If the office needs to give you a call back, can they leave a voicemail: [] Yes [x] No    Cinthia Darling   06/09/23 11:50 EDT

## 2023-07-24 DIAGNOSIS — E78.2 MIXED HYPERLIPIDEMIA: ICD-10-CM

## 2023-07-24 RX ORDER — ATORVASTATIN CALCIUM 40 MG/1
TABLET, FILM COATED ORAL
Qty: 90 TABLET | Refills: 0 | Status: SHIPPED | OUTPATIENT
Start: 2023-07-24

## 2023-08-09 ENCOUNTER — OFFICE VISIT (OUTPATIENT)
Dept: FAMILY MEDICINE CLINIC | Facility: CLINIC | Age: 58
End: 2023-08-09
Payer: MEDICARE

## 2023-08-09 VITALS
BODY MASS INDEX: 38.64 KG/M2 | OXYGEN SATURATION: 94 % | WEIGHT: 246.2 LBS | HEART RATE: 97 BPM | DIASTOLIC BLOOD PRESSURE: 80 MMHG | SYSTOLIC BLOOD PRESSURE: 134 MMHG | TEMPERATURE: 97.7 F | HEIGHT: 67 IN

## 2023-08-09 DIAGNOSIS — I10 ESSENTIAL HYPERTENSION: ICD-10-CM

## 2023-08-09 DIAGNOSIS — Z53.21 PATIENT LEFT WITHOUT BEING SEEN: Primary | ICD-10-CM

## 2023-08-09 DIAGNOSIS — E78.2 MIXED HYPERLIPIDEMIA: ICD-10-CM

## 2023-08-09 PROCEDURE — 99024 POSTOP FOLLOW-UP VISIT: CPT | Performed by: FAMILY MEDICINE

## 2023-08-09 PROCEDURE — 3079F DIAST BP 80-89 MM HG: CPT | Performed by: FAMILY MEDICINE

## 2023-08-09 PROCEDURE — 3075F SYST BP GE 130 - 139MM HG: CPT | Performed by: FAMILY MEDICINE

## 2023-08-09 RX ORDER — METOPROLOL SUCCINATE 25 MG/1
25 TABLET, EXTENDED RELEASE ORAL DAILY
Qty: 30 TABLET | Refills: 2 | Status: SHIPPED | OUTPATIENT
Start: 2023-08-09

## 2023-08-09 RX ORDER — LISINOPRIL 20 MG/1
20 TABLET ORAL DAILY
Qty: 30 TABLET | Refills: 2 | Status: SHIPPED | OUTPATIENT
Start: 2023-08-09

## 2023-08-09 RX ORDER — ZIPRASIDONE HYDROCHLORIDE 60 MG/1
CAPSULE ORAL
COMMUNITY
Start: 2023-07-14

## 2023-08-09 RX ORDER — ATORVASTATIN CALCIUM 40 MG/1
40 TABLET, FILM COATED ORAL DAILY
Qty: 90 TABLET | Refills: 0 | Status: SHIPPED | OUTPATIENT
Start: 2023-08-09

## 2023-08-09 RX ORDER — IBUPROFEN 800 MG/1
800 TABLET ORAL EVERY 6 HOURS PRN
Qty: 90 TABLET | Refills: 2 | Status: SHIPPED | OUTPATIENT
Start: 2023-08-09

## 2023-08-10 NOTE — PROGRESS NOTES
The patient left the office before care was provided and did not complete the visit  could hear other patients children and this increased her anxiety .

## 2023-09-05 RX ORDER — ASPIRIN 81 MG/1
TABLET, COATED ORAL
Qty: 30 TABLET | Refills: 2 | OUTPATIENT
Start: 2023-09-05

## 2023-10-03 DIAGNOSIS — I10 ESSENTIAL HYPERTENSION: ICD-10-CM

## 2023-10-03 RX ORDER — LISINOPRIL 20 MG/1
TABLET ORAL
Qty: 30 TABLET | Refills: 2 | Status: SHIPPED | OUTPATIENT
Start: 2023-10-03

## 2023-12-07 NOTE — TELEPHONE ENCOUNTER
Caller: AISLINN    Relationship: [facility]    Best call back number:     Requested Prescriptions:   Requested Prescriptions     Pending Prescriptions Disp Refills    Aspirin 81 MG capsule 30 capsule 2     Sig: Take 1 capsule by mouth Daily.        Pharmacy where request should be sent: [facility] - [place], [place] - [address] - [phone]  - [phone] FX     Last office visit with prescribing clinician: 8/9/2023   Last telemedicine visit with prescribing clinician: Visit date not found   Next office visit with prescribing clinician: Visit date not found     Additional details provided by patient: UNKNOWN    Does the patient have less than a 3 day supply:  [] Yes  [] No    Would you like a call back once the refill request has been completed: [] Yes [] No    If the office needs to give you a call back, can they leave a voicemail: [] Yes [] No    Cinthia Rolon Rep   12/07/23 12:09 EST

## 2024-01-22 ENCOUNTER — TRANSCRIBE ORDERS (OUTPATIENT)
Dept: ADMINISTRATIVE | Facility: HOSPITAL | Age: 59
End: 2024-01-22
Payer: MEDICARE

## 2024-01-22 ENCOUNTER — HOSPITAL ENCOUNTER (OUTPATIENT)
Dept: GENERAL RADIOLOGY | Facility: HOSPITAL | Age: 59
Discharge: HOME | End: 2024-01-22
Payer: MEDICARE

## 2024-01-22 DIAGNOSIS — R09.89 ABNORMAL CHEST SOUNDS: ICD-10-CM

## 2024-01-22 DIAGNOSIS — R09.89 ABNORMAL CHEST SOUNDS: Primary | ICD-10-CM

## 2024-01-22 PROCEDURE — 71046 X-RAY EXAM CHEST 2 VIEWS: CPT

## 2024-01-22 PROCEDURE — 71046 X-RAY EXAM CHEST 2 VIEWS: CPT | Performed by: RADIOLOGY

## 2024-03-04 RX ORDER — ASPIRIN 81 MG/1
81 TABLET, COATED ORAL DAILY
Qty: 30 TABLET | Refills: 2 | Status: SHIPPED | OUTPATIENT
Start: 2024-03-04

## 2024-03-15 DIAGNOSIS — I10 ESSENTIAL HYPERTENSION: ICD-10-CM

## 2024-03-15 RX ORDER — METOPROLOL SUCCINATE 25 MG/1
25 TABLET, EXTENDED RELEASE ORAL DAILY
Qty: 30 TABLET | Refills: 2 | OUTPATIENT
Start: 2024-03-15

## 2024-06-03 RX ORDER — ASPIRIN 81 MG/1
81 TABLET, COATED ORAL DAILY
Qty: 30 TABLET | Refills: 2 | Status: SHIPPED | OUTPATIENT
Start: 2024-06-03

## 2024-06-24 ENCOUNTER — LAB (OUTPATIENT)
Dept: LAB | Facility: HOSPITAL | Age: 59
End: 2024-06-24
Payer: MEDICARE

## 2024-06-24 ENCOUNTER — TRANSCRIBE ORDERS (OUTPATIENT)
Dept: ADMINISTRATIVE | Facility: HOSPITAL | Age: 59
End: 2024-06-24
Payer: MEDICARE

## 2024-06-24 DIAGNOSIS — Z13.1 SCREENING FOR DIABETES MELLITUS: ICD-10-CM

## 2024-06-24 DIAGNOSIS — F20.9 SCHIZOPHRENIA, UNSPECIFIED TYPE: Primary | ICD-10-CM

## 2024-06-24 DIAGNOSIS — I10 HYPERTENSION, ESSENTIAL: ICD-10-CM

## 2024-06-24 DIAGNOSIS — F20.9 SCHIZOPHRENIA, UNSPECIFIED TYPE: ICD-10-CM

## 2024-06-24 DIAGNOSIS — E78.00 HYPERCHOLESTEREMIA: ICD-10-CM

## 2024-06-24 LAB
25(OH)D3 SERPL-MCNC: 36.7 NG/ML (ref 30–100)
ALBUMIN SERPL-MCNC: 4 G/DL (ref 3.5–5.2)
ALBUMIN/GLOB SERPL: 1.2 G/DL
ALP SERPL-CCNC: 86 U/L (ref 39–117)
ALT SERPL W P-5'-P-CCNC: 23 U/L (ref 1–41)
ANION GAP SERPL CALCULATED.3IONS-SCNC: 12.3 MMOL/L (ref 5–15)
AST SERPL-CCNC: 29 U/L (ref 1–40)
BASOPHILS # BLD AUTO: 0.07 10*3/MM3 (ref 0–0.2)
BASOPHILS NFR BLD AUTO: 0.7 % (ref 0–1.5)
BILIRUB CONJ SERPL-MCNC: <0.2 MG/DL (ref 0–0.3)
BILIRUB SERPL-MCNC: 0.4 MG/DL (ref 0–1.2)
BUN SERPL-MCNC: 21 MG/DL (ref 8–23)
BUN/CREAT SERPL: 21 (ref 7–25)
CALCIUM SPEC-SCNC: 9.6 MG/DL (ref 8.6–10.5)
CHLORIDE SERPL-SCNC: 105 MMOL/L (ref 98–107)
CHOLEST SERPL-MCNC: 137 MG/DL (ref 0–200)
CO2 SERPL-SCNC: 22.7 MMOL/L (ref 22–29)
CREAT SERPL-MCNC: 1 MG/DL (ref 0.76–1.27)
DEPRECATED RDW RBC AUTO: 41.5 FL (ref 37–54)
EGFRCR SERPLBLD CKD-EPI 2021: 86.2 ML/MIN/1.73
EOSINOPHIL # BLD AUTO: 0.28 10*3/MM3 (ref 0–0.4)
EOSINOPHIL NFR BLD AUTO: 2.9 % (ref 0.3–6.2)
ERYTHROCYTE [DISTWIDTH] IN BLOOD BY AUTOMATED COUNT: 12.7 % (ref 12.3–15.4)
FOLATE SERPL-MCNC: <2 NG/ML (ref 4.78–24.2)
GLOBULIN UR ELPH-MCNC: 3.3 GM/DL
GLUCOSE SERPL-MCNC: 104 MG/DL (ref 65–99)
HBA1C MFR BLD: 5.4 % (ref 4.8–5.6)
HCT VFR BLD AUTO: 44.8 % (ref 37.5–51)
HDLC SERPL-MCNC: 27 MG/DL (ref 40–60)
HGB BLD-MCNC: 15.1 G/DL (ref 13–17.7)
IMM GRANULOCYTES # BLD AUTO: 0.03 10*3/MM3 (ref 0–0.05)
IMM GRANULOCYTES NFR BLD AUTO: 0.3 % (ref 0–0.5)
LDLC SERPL CALC-MCNC: 85 MG/DL (ref 0–100)
LDLC/HDLC SERPL: 3.04 {RATIO}
LYMPHOCYTES # BLD AUTO: 2.56 10*3/MM3 (ref 0.7–3.1)
LYMPHOCYTES NFR BLD AUTO: 26.1 % (ref 19.6–45.3)
MCH RBC QN AUTO: 30.1 PG (ref 26.6–33)
MCHC RBC AUTO-ENTMCNC: 33.7 G/DL (ref 31.5–35.7)
MCV RBC AUTO: 89.4 FL (ref 79–97)
MONOCYTES # BLD AUTO: 0.94 10*3/MM3 (ref 0.1–0.9)
MONOCYTES NFR BLD AUTO: 9.6 % (ref 5–12)
NEUTROPHILS NFR BLD AUTO: 5.92 10*3/MM3 (ref 1.7–7)
NEUTROPHILS NFR BLD AUTO: 60.4 % (ref 42.7–76)
NRBC BLD AUTO-RTO: 0 /100 WBC (ref 0–0.2)
PLATELET # BLD AUTO: 370 10*3/MM3 (ref 140–450)
PMV BLD AUTO: 9.4 FL (ref 6–12)
POTASSIUM SERPL-SCNC: 4 MMOL/L (ref 3.5–5.2)
PROLACTIN SERPL-MCNC: 20.8 NG/ML (ref 4.04–15.2)
PROT SERPL-MCNC: 7.3 G/DL (ref 6–8.5)
RBC # BLD AUTO: 5.01 10*6/MM3 (ref 4.14–5.8)
SODIUM SERPL-SCNC: 140 MMOL/L (ref 136–145)
TRIGL SERPL-MCNC: 139 MG/DL (ref 0–150)
TSH SERPL DL<=0.05 MIU/L-ACNC: 1.89 UIU/ML (ref 0.27–4.2)
VIT B12 BLD-MCNC: 483 PG/ML (ref 211–946)
VLDLC SERPL-MCNC: 25 MG/DL (ref 5–40)
WBC NRBC COR # BLD AUTO: 9.8 10*3/MM3 (ref 3.4–10.8)

## 2024-06-24 PROCEDURE — 80053 COMPREHEN METABOLIC PANEL: CPT

## 2024-06-24 PROCEDURE — 82746 ASSAY OF FOLIC ACID SERUM: CPT

## 2024-06-24 PROCEDURE — 84146 ASSAY OF PROLACTIN: CPT

## 2024-06-24 PROCEDURE — 80061 LIPID PANEL: CPT

## 2024-06-24 PROCEDURE — 85025 COMPLETE CBC W/AUTO DIFF WBC: CPT

## 2024-06-24 PROCEDURE — 83036 HEMOGLOBIN GLYCOSYLATED A1C: CPT

## 2024-06-24 PROCEDURE — 82607 VITAMIN B-12: CPT

## 2024-06-24 PROCEDURE — 84443 ASSAY THYROID STIM HORMONE: CPT

## 2024-06-24 PROCEDURE — 82306 VITAMIN D 25 HYDROXY: CPT

## 2024-06-24 PROCEDURE — 36415 COLL VENOUS BLD VENIPUNCTURE: CPT

## 2024-06-24 PROCEDURE — 82248 BILIRUBIN DIRECT: CPT

## 2024-09-12 ENCOUNTER — LAB (OUTPATIENT)
Dept: LAB | Facility: HOSPITAL | Age: 59
End: 2024-09-12
Payer: MEDICARE

## 2024-09-12 ENCOUNTER — TRANSCRIBE ORDERS (OUTPATIENT)
Dept: ADMINISTRATIVE | Facility: HOSPITAL | Age: 59
End: 2024-09-12
Payer: MEDICARE

## 2024-09-12 DIAGNOSIS — F20.9 SUBCHRONIC SCHIZOPHRENIA: ICD-10-CM

## 2024-09-12 DIAGNOSIS — E78.00 PURE HYPERCHOLESTEROLEMIA: ICD-10-CM

## 2024-09-12 DIAGNOSIS — Z13.1 SCREENING FOR DIABETES MELLITUS: ICD-10-CM

## 2024-09-12 DIAGNOSIS — I10 ESSENTIAL HYPERTENSION, MALIGNANT: ICD-10-CM

## 2024-09-12 DIAGNOSIS — F20.9 SUBCHRONIC SCHIZOPHRENIA: Primary | ICD-10-CM

## 2024-09-12 LAB
25(OH)D3 SERPL-MCNC: 39.9 NG/ML (ref 30–100)
ALBUMIN SERPL-MCNC: 3.9 G/DL (ref 3.5–5.2)
ALBUMIN/GLOB SERPL: 1.3 G/DL
ALP SERPL-CCNC: 87 U/L (ref 39–117)
ALT SERPL W P-5'-P-CCNC: 28 U/L (ref 1–41)
ANION GAP SERPL CALCULATED.3IONS-SCNC: 11.2 MMOL/L (ref 5–15)
AST SERPL-CCNC: 29 U/L (ref 1–40)
BASOPHILS # BLD AUTO: 0.05 10*3/MM3 (ref 0–0.2)
BASOPHILS NFR BLD AUTO: 0.6 % (ref 0–1.5)
BILIRUB CONJ SERPL-MCNC: <0.2 MG/DL (ref 0–0.3)
BILIRUB SERPL-MCNC: 0.4 MG/DL (ref 0–1.2)
BUN SERPL-MCNC: 19 MG/DL (ref 8–23)
BUN/CREAT SERPL: 22.1 (ref 7–25)
CALCIUM SPEC-SCNC: 9 MG/DL (ref 8.6–10.5)
CHLORIDE SERPL-SCNC: 106 MMOL/L (ref 98–107)
CHOLEST SERPL-MCNC: 135 MG/DL (ref 0–200)
CO2 SERPL-SCNC: 23.8 MMOL/L (ref 22–29)
CREAT SERPL-MCNC: 0.86 MG/DL (ref 0.76–1.27)
DEPRECATED RDW RBC AUTO: 44.7 FL (ref 37–54)
EGFRCR SERPLBLD CKD-EPI 2021: 98.5 ML/MIN/1.73
EOSINOPHIL # BLD AUTO: 0.29 10*3/MM3 (ref 0–0.4)
EOSINOPHIL NFR BLD AUTO: 3.3 % (ref 0.3–6.2)
ERYTHROCYTE [DISTWIDTH] IN BLOOD BY AUTOMATED COUNT: 13.1 % (ref 12.3–15.4)
FOLATE SERPL-MCNC: 6.9 NG/ML (ref 4.78–24.2)
GLOBULIN UR ELPH-MCNC: 3.1 GM/DL
GLUCOSE SERPL-MCNC: 114 MG/DL (ref 65–99)
HBA1C MFR BLD: 5.3 % (ref 4.8–5.6)
HCT VFR BLD AUTO: 46.1 % (ref 37.5–51)
HDLC SERPL-MCNC: 28 MG/DL (ref 40–60)
HGB BLD-MCNC: 14.8 G/DL (ref 13–17.7)
IMM GRANULOCYTES # BLD AUTO: 0.01 10*3/MM3 (ref 0–0.05)
IMM GRANULOCYTES NFR BLD AUTO: 0.1 % (ref 0–0.5)
LDLC SERPL CALC-MCNC: 79 MG/DL (ref 0–100)
LDLC/HDLC SERPL: 2.66 {RATIO}
LYMPHOCYTES # BLD AUTO: 2.53 10*3/MM3 (ref 0.7–3.1)
LYMPHOCYTES NFR BLD AUTO: 28.5 % (ref 19.6–45.3)
MCH RBC QN AUTO: 29.8 PG (ref 26.6–33)
MCHC RBC AUTO-ENTMCNC: 32.1 G/DL (ref 31.5–35.7)
MCV RBC AUTO: 92.8 FL (ref 79–97)
MONOCYTES # BLD AUTO: 0.83 10*3/MM3 (ref 0.1–0.9)
MONOCYTES NFR BLD AUTO: 9.3 % (ref 5–12)
NEUTROPHILS NFR BLD AUTO: 5.18 10*3/MM3 (ref 1.7–7)
NEUTROPHILS NFR BLD AUTO: 58.2 % (ref 42.7–76)
NRBC BLD AUTO-RTO: 0 /100 WBC (ref 0–0.2)
PLATELET # BLD AUTO: 313 10*3/MM3 (ref 140–450)
PMV BLD AUTO: 9.8 FL (ref 6–12)
POTASSIUM SERPL-SCNC: 3.7 MMOL/L (ref 3.5–5.2)
PROLACTIN SERPL-MCNC: 29.8 NG/ML (ref 4.04–15.2)
PROT SERPL-MCNC: 7 G/DL (ref 6–8.5)
RBC # BLD AUTO: 4.97 10*6/MM3 (ref 4.14–5.8)
SODIUM SERPL-SCNC: 141 MMOL/L (ref 136–145)
TRIGL SERPL-MCNC: 162 MG/DL (ref 0–150)
TSH SERPL DL<=0.05 MIU/L-ACNC: 1.85 UIU/ML (ref 0.27–4.2)
VIT B12 BLD-MCNC: 507 PG/ML (ref 211–946)
VLDLC SERPL-MCNC: 28 MG/DL (ref 5–40)
WBC NRBC COR # BLD AUTO: 8.89 10*3/MM3 (ref 3.4–10.8)

## 2024-09-12 PROCEDURE — 80053 COMPREHEN METABOLIC PANEL: CPT

## 2024-09-12 PROCEDURE — 83036 HEMOGLOBIN GLYCOSYLATED A1C: CPT

## 2024-09-12 PROCEDURE — 84146 ASSAY OF PROLACTIN: CPT

## 2024-09-12 PROCEDURE — 82306 VITAMIN D 25 HYDROXY: CPT

## 2024-09-12 PROCEDURE — 82607 VITAMIN B-12: CPT

## 2024-09-12 PROCEDURE — 85025 COMPLETE CBC W/AUTO DIFF WBC: CPT

## 2024-09-12 PROCEDURE — 36415 COLL VENOUS BLD VENIPUNCTURE: CPT

## 2024-09-12 PROCEDURE — 82746 ASSAY OF FOLIC ACID SERUM: CPT

## 2024-09-12 PROCEDURE — 80061 LIPID PANEL: CPT

## 2024-09-12 PROCEDURE — 82248 BILIRUBIN DIRECT: CPT

## 2024-09-12 PROCEDURE — 84443 ASSAY THYROID STIM HORMONE: CPT

## 2024-09-21 LAB
25(OH)D2 SERPL-MCNC: <1 NG/ML
25(OH)D3 SERPL-MCNC: 39 NG/ML
25(OH)D3+25(OH)D2 SERPL-MCNC: 39 NG/ML

## 2025-03-18 ENCOUNTER — LAB (OUTPATIENT)
Dept: LAB | Facility: HOSPITAL | Age: 60
End: 2025-03-18
Payer: MEDICARE

## 2025-03-18 ENCOUNTER — HOSPITAL ENCOUNTER (OUTPATIENT)
Dept: RESPIRATORY THERAPY | Facility: HOSPITAL | Age: 60
Discharge: HOME | End: 2025-03-18
Payer: MEDICARE

## 2025-03-18 ENCOUNTER — TRANSCRIBE ORDERS (OUTPATIENT)
Dept: ADMINISTRATIVE | Facility: HOSPITAL | Age: 60
End: 2025-03-18
Payer: MEDICARE

## 2025-03-18 DIAGNOSIS — F20.9 SCHIZOPHRENIA, UNSPECIFIED TYPE: ICD-10-CM

## 2025-03-18 DIAGNOSIS — F20.9 SCHIZOPHRENIA, UNSPECIFIED TYPE: Primary | ICD-10-CM

## 2025-03-18 LAB
25(OH)D3 SERPL-MCNC: 44.9 NG/ML (ref 30–100)
ALBUMIN SERPL-MCNC: 3.9 G/DL (ref 3.5–5.2)
ALBUMIN/GLOB SERPL: 1.1 G/DL
ALP SERPL-CCNC: 87 U/L (ref 39–117)
ALT SERPL W P-5'-P-CCNC: 36 U/L (ref 1–41)
ANION GAP SERPL CALCULATED.3IONS-SCNC: 10.6 MMOL/L (ref 5–15)
AST SERPL-CCNC: 38 U/L (ref 1–40)
BASOPHILS # BLD AUTO: 0.07 10*3/MM3 (ref 0–0.2)
BASOPHILS NFR BLD AUTO: 0.7 % (ref 0–1.5)
BILIRUB CONJ SERPL-MCNC: 0.1 MG/DL (ref 0–0.3)
BILIRUB SERPL-MCNC: 0.4 MG/DL (ref 0–1.2)
BUN SERPL-MCNC: 20 MG/DL (ref 8–23)
BUN/CREAT SERPL: 22.7 (ref 7–25)
CALCIUM SPEC-SCNC: 8.8 MG/DL (ref 8.6–10.5)
CHLORIDE SERPL-SCNC: 106 MMOL/L (ref 98–107)
CHOLEST SERPL-MCNC: 119 MG/DL (ref 0–200)
CO2 SERPL-SCNC: 22.4 MMOL/L (ref 22–29)
CREAT SERPL-MCNC: 0.88 MG/DL (ref 0.76–1.27)
DEPRECATED RDW RBC AUTO: 44.9 FL (ref 37–54)
EGFRCR SERPLBLD CKD-EPI 2021: 97.8 ML/MIN/1.73
EOSINOPHIL # BLD AUTO: 0.58 10*3/MM3 (ref 0–0.4)
EOSINOPHIL NFR BLD AUTO: 5.5 % (ref 0.3–6.2)
ERYTHROCYTE [DISTWIDTH] IN BLOOD BY AUTOMATED COUNT: 13.2 % (ref 12.3–15.4)
FOLATE SERPL-MCNC: 10.4 NG/ML (ref 4.78–24.2)
GLOBULIN UR ELPH-MCNC: 3.5 GM/DL
GLUCOSE SERPL-MCNC: 96 MG/DL (ref 65–99)
HBA1C MFR BLD: 5.3 % (ref 4.8–5.6)
HCT VFR BLD AUTO: 53 % (ref 37.5–51)
HDLC SERPL-MCNC: 26 MG/DL (ref 40–60)
HGB BLD-MCNC: 16.9 G/DL (ref 13–17.7)
IMM GRANULOCYTES # BLD AUTO: 0.03 10*3/MM3 (ref 0–0.05)
IMM GRANULOCYTES NFR BLD AUTO: 0.3 % (ref 0–0.5)
LDLC SERPL CALC-MCNC: 67 MG/DL (ref 0–100)
LDLC/HDLC SERPL: 2.42 {RATIO}
LYMPHOCYTES # BLD AUTO: 2.51 10*3/MM3 (ref 0.7–3.1)
LYMPHOCYTES NFR BLD AUTO: 23.8 % (ref 19.6–45.3)
MCH RBC QN AUTO: 29.5 PG (ref 26.6–33)
MCHC RBC AUTO-ENTMCNC: 31.9 G/DL (ref 31.5–35.7)
MCV RBC AUTO: 92.7 FL (ref 79–97)
MONOCYTES # BLD AUTO: 0.84 10*3/MM3 (ref 0.1–0.9)
MONOCYTES NFR BLD AUTO: 8 % (ref 5–12)
NEUTROPHILS NFR BLD AUTO: 6.51 10*3/MM3 (ref 1.7–7)
NEUTROPHILS NFR BLD AUTO: 61.7 % (ref 42.7–76)
NRBC BLD AUTO-RTO: 0 /100 WBC (ref 0–0.2)
PLATELET # BLD AUTO: 343 10*3/MM3 (ref 140–450)
PMV BLD AUTO: 9.4 FL (ref 6–12)
POTASSIUM SERPL-SCNC: 3.9 MMOL/L (ref 3.5–5.2)
PROLACTIN SERPL-MCNC: 38.6 NG/ML (ref 4.04–15.2)
PROT SERPL-MCNC: 7.4 G/DL (ref 6–8.5)
RBC # BLD AUTO: 5.72 10*6/MM3 (ref 4.14–5.8)
SODIUM SERPL-SCNC: 139 MMOL/L (ref 136–145)
TESTOST SERPL-MCNC: 531 NG/DL (ref 193–740)
TRIGL SERPL-MCNC: 150 MG/DL (ref 0–150)
VIT B12 BLD-MCNC: 744 PG/ML (ref 211–946)
VLDLC SERPL-MCNC: 26 MG/DL (ref 5–40)
WBC NRBC COR # BLD AUTO: 10.54 10*3/MM3 (ref 3.4–10.8)

## 2025-03-18 PROCEDURE — 36415 COLL VENOUS BLD VENIPUNCTURE: CPT

## 2025-03-18 PROCEDURE — 82306 VITAMIN D 25 HYDROXY: CPT

## 2025-03-18 PROCEDURE — 84146 ASSAY OF PROLACTIN: CPT

## 2025-03-18 PROCEDURE — 83036 HEMOGLOBIN GLYCOSYLATED A1C: CPT

## 2025-03-18 PROCEDURE — 84403 ASSAY OF TOTAL TESTOSTERONE: CPT

## 2025-03-18 PROCEDURE — 80053 COMPREHEN METABOLIC PANEL: CPT

## 2025-03-18 PROCEDURE — 82746 ASSAY OF FOLIC ACID SERUM: CPT

## 2025-03-18 PROCEDURE — 85025 COMPLETE CBC W/AUTO DIFF WBC: CPT

## 2025-03-18 PROCEDURE — 80061 LIPID PANEL: CPT

## 2025-03-18 PROCEDURE — 93005 ELECTROCARDIOGRAM TRACING: CPT | Performed by: NURSE PRACTITIONER

## 2025-03-18 PROCEDURE — 82248 BILIRUBIN DIRECT: CPT

## 2025-03-18 PROCEDURE — 82672 ASSAY OF ESTROGEN: CPT

## 2025-03-18 PROCEDURE — 82607 VITAMIN B-12: CPT

## 2025-03-19 LAB
QT INTERVAL: 372 MS
QTC INTERVAL: 445 MS

## 2025-03-24 LAB — ESTROGEN SERPL-MCNC: NORMAL PG/ML

## 2025-03-25 ENCOUNTER — OFFICE VISIT (OUTPATIENT)
Dept: FAMILY MEDICINE CLINIC | Facility: CLINIC | Age: 60
End: 2025-03-25
Payer: MEDICARE

## 2025-03-25 VITALS
SYSTOLIC BLOOD PRESSURE: 116 MMHG | BODY MASS INDEX: 40.56 KG/M2 | TEMPERATURE: 96.6 F | HEIGHT: 67 IN | WEIGHT: 258.4 LBS | HEART RATE: 100 BPM | DIASTOLIC BLOOD PRESSURE: 78 MMHG | OXYGEN SATURATION: 94 %

## 2025-03-25 DIAGNOSIS — R97.20 ELEVATED PSA: Primary | ICD-10-CM

## 2025-03-25 DIAGNOSIS — N40.1 BENIGN PROSTATIC HYPERPLASIA WITH URINARY FREQUENCY: ICD-10-CM

## 2025-03-25 DIAGNOSIS — R35.0 BENIGN PROSTATIC HYPERPLASIA WITH URINARY FREQUENCY: ICD-10-CM

## 2025-03-25 DIAGNOSIS — I10 ESSENTIAL HYPERTENSION: ICD-10-CM

## 2025-03-25 DIAGNOSIS — F20.0 PARANOID SCHIZOPHRENIA: ICD-10-CM

## 2025-03-25 DIAGNOSIS — G25.2 RESTING TREMOR: ICD-10-CM

## 2025-03-25 RX ORDER — TAMSULOSIN HYDROCHLORIDE 0.4 MG/1
1 CAPSULE ORAL DAILY
Qty: 90 CAPSULE | Refills: 2 | Status: SHIPPED | OUTPATIENT
Start: 2025-03-25

## 2025-03-25 RX ORDER — LISINOPRIL 20 MG/1
20 TABLET ORAL DAILY
Qty: 90 TABLET | Refills: 1 | Status: SHIPPED | OUTPATIENT
Start: 2025-03-25

## 2025-03-25 RX ORDER — CETIRIZINE HYDROCHLORIDE 10 MG/1
10 TABLET ORAL DAILY
COMMUNITY

## 2025-03-25 RX ORDER — OLANZAPINE 10 MG/1
10 TABLET ORAL NIGHTLY
COMMUNITY

## 2025-03-25 RX ORDER — DIVALPROEX SODIUM 250 MG/1
250 TABLET, FILM COATED, EXTENDED RELEASE ORAL DAILY
COMMUNITY

## 2025-03-25 NOTE — PROGRESS NOTES
Chief Complaint  Establish Care (Pt asked to check PSA levels.)    HPI:   JENNIFER Taylor is a 59 y.o. male who presents today to [facility] for Establish Care (Pt asked to check PSA levels.).  History of Present Illness  The patient presents for evaluation of prostatitis, schizophrenia, hypertension, and tremors.    He was diagnosed with prostatitis at a clinic in [place], where he underwent a 6-week test that revealed a PSA level of 4.2. A subsequent test conducted here showed a slight decrease to 4.1. He has completed a 14-day course of antibiotics and has been advised to undergo another PSA test. He reports difficulty in urination, describing it as a stop-and-start flow, requiring him to exert pressure to continue. He expresses concern about the possibility of prostate cancer. He is currently on testosterone therapy, administered as 0.5 cc injections every 2 weeks, prescribed by a nurse practitioner in [place]. He has been self-administering these injections.    He is also under psychiatric care at [facility] for schizophrenia, which causes him significant paranoia and anxiety, particularly when leaving the facility. He has been experiencing depression for over 3 weeks, during which he has been taking half of his usual medication dose. He reports feeling sluggish and unmotivated, spending most of his time watching TV.    His blood pressure readings have been consistently high, with diastolic values exceeding 100. He has recently been started on metoprolol 50 mg.    He has noticed the onset of tremors in the past few months, which he attributes to nervousness.     Previous History:   Past Medical History:   Diagnosis Date    Anxiety     Chronic pain disorder     Delusional disorder     Depression     Head injury     Hormone disorder     Hypertension     Liver disease     Low back pain     Paranoid schizophrenia     Psychosis       Past Surgical History:   Procedure  Laterality Date    BREAST SURGERY      EXTERNAL EAR SURGERY      HAIR TRANSPLANT      MANDIBLE SURGERY      NOSE SURGERY      REPLACEMENT TOTAL KNEE      SHOULDER SURGERY      TONSILLECTOMY        Social History     Socioeconomic History    Marital status: Single   Tobacco Use    Smoking status: Never    Smokeless tobacco: Never   Vaping Use    Vaping status: Never Used   Substance and Sexual Activity    Alcohol use: No    Drug use: No     Comment: h/o opiate dependence     Sexual activity: Not Currently      Health Maintenance Due   Topic Date Due    COLORECTAL CANCER SCREENING  Never done    ZOSTER VACCINE (1 of 2) Never done    ANNUAL WELLNESS VISIT  05/09/2024    COVID-19 Vaccine (4 - 2024-25 season) 09/01/2024        Current Medications:  Current Outpatient Medications   Medication Sig Dispense Refill    amitriptyline (ELAVIL) 75 MG tablet       Aspirin Low Dose 81 MG EC tablet TAKE 1 TABLET BY MOUTH DAILY 30 tablet 2    atorvastatin (LIPITOR) 40 MG tablet Take 1 tablet by mouth Daily. FOR CHOLESTEROL 90 tablet 0    cetirizine (zyrTEC) 10 MG tablet Take 1 tablet by mouth Daily.      divalproex (DEPAKOTE ER) 250 MG 24 hr tablet Take 1 tablet by mouth Daily.      ibuprofen (ADVIL,MOTRIN) 800 MG tablet Take 1 tablet by mouth Every 6 (Six) Hours As Needed for Mild Pain. 90 tablet 2    lisinopril (PRINIVIL,ZESTRIL) 20 MG tablet Take 1 tablet by mouth Daily. 90 tablet 1    lurasidone HCl (LATUDA) 60 MG tablet tablet Take 1 tablet by mouth Daily.      metoprolol succinate XL (TOPROL-XL) 25 MG 24 hr tablet Take 1 tablet by mouth Daily. 30 tablet 2    OLANZapine (zyPREXA) 10 MG tablet Take 1 tablet by mouth Every Night.      OLANZapine (ZyPREXA) 20 MG tablet Take 1 tablet by mouth Daily. Indications: Schizophrenia 30 tablet 0    sertraline (Zoloft) 100 MG tablet Take 1 & 1/2 tablets by mouth daily.  Indications: depression (Patient taking differently: Take 1 tablet by mouth. Take 2 tablets by mouth daily.  Indications:  "depression  Indications: depression) 46 tablet 0    ondansetron (ZOFRAN) 4 MG tablet Take 1 tablet by mouth Every 8 (Eight) Hours As Needed for Nausea or Vomiting. (Patient not taking: Reported on 8/9/2023)      tamsulosin (FLOMAX) 0.4 MG capsule 24 hr capsule Take 1 capsule by mouth Daily. 90 capsule 2     No current facility-administered medications for this visit.       Allergies:   Allergies   Allergen Reactions    Invega [Paliperidone Er] Other (See Comments)     Gynecomastia     Risperidone And Related Other (See Comments)     Gynecomastia        Vitals:   /78 (BP Location: Right arm, Patient Position: Sitting, Cuff Size: Large Adult)   Pulse 100   Temp 96.6 °F (35.9 °C) (Temporal)   Ht 170.2 cm (67\")   Wt 117 kg (258 lb 6.4 oz)   SpO2 94%   BMI 40.47 kg/m²     Estimated body mass index is 40.47 kg/m² as calculated from the following:    Height as of this encounter: 170.2 cm (67\").    Weight as of this encounter: 117 kg (258 lb 6.4 oz).    Jose Claudia  reports that he has never smoked. He has never used smokeless tobacco.          Physical Exam:   Physical Exam  Constitutional:       Appearance: Normal appearance.   HENT:      Mouth/Throat:      Mouth: Mucous membranes are moist.   Cardiovascular:      Rate and Rhythm: Normal rate and regular rhythm.   Pulmonary:      Effort: Pulmonary effort is normal.      Breath sounds: Normal breath sounds. No wheezing or rhonchi.   Musculoskeletal:         General: Normal range of motion.   Skin:     General: Skin is warm and dry.   Neurological:      Mental Status: He is alert.   Psychiatric:         Mood and Affect: Mood normal.          Lab Results:   Hospital Outpatient Visit on 03/18/2025   Component Date Value Ref Range Status    QT Interval 03/18/2025 372  ms Final    QTC Interval 03/18/2025 445  ms Final   Lab on 03/18/2025   Component Date Value Ref Range Status    Glucose 03/18/2025 96  65 - 99 mg/dL Final    BUN 03/18/2025 20  8 - 23 mg/dL Final    " Creatinine 03/18/2025 0.88  0.76 - 1.27 mg/dL Final    Sodium 03/18/2025 139  136 - 145 mmol/L Final    Potassium 03/18/2025 3.9  3.5 - 5.2 mmol/L Final    Chloride 03/18/2025 106  98 - 107 mmol/L Final    CO2 03/18/2025 22.4  22.0 - 29.0 mmol/L Final    Calcium 03/18/2025 8.8  8.6 - 10.5 mg/dL Final    Total Protein 03/18/2025 7.4  6.0 - 8.5 g/dL Final    Albumin 03/18/2025 3.9  3.5 - 5.2 g/dL Final    ALT (SGPT) 03/18/2025 36  1 - 41 U/L Final    AST (SGOT) 03/18/2025 38  1 - 40 U/L Final    Alkaline Phosphatase 03/18/2025 87  39 - 117 U/L Final    Total Bilirubin 03/18/2025 0.4  0.0 - 1.2 mg/dL Final    Globulin 03/18/2025 3.5  gm/dL Final    A/G Ratio 03/18/2025 1.1  g/dL Final    BUN/Creatinine Ratio 03/18/2025 22.7  7.0 - 25.0 Final    Anion Gap 03/18/2025 10.6  5.0 - 15.0 mmol/L Final    eGFR 03/18/2025 97.8  >60.0 mL/min/1.73 Final    Vitamin B-12 03/18/2025 744  211 - 946 pg/mL Final    Folate 03/18/2025 10.40  4.78 - 24.20 ng/mL Final    25 Hydroxy, Vitamin D 03/18/2025 44.9  30.0 - 100.0 ng/ml Final    Hemoglobin A1C 03/18/2025 5.30  4.80 - 5.60 % Final    Total Cholesterol 03/18/2025 119  0 - 200 mg/dL Final    Triglycerides 03/18/2025 150  0 - 150 mg/dL Final    HDL Cholesterol 03/18/2025 26 (L)  40 - 60 mg/dL Final    LDL Cholesterol  03/18/2025 67  0 - 100 mg/dL Final    VLDL Cholesterol 03/18/2025 26  5 - 40 mg/dL Final    LDL/HDL Ratio 03/18/2025 2.42   Final    Testosterone, Total 03/18/2025 531.00  193.00 - 740.00 ng/dL Final    Estrogen 03/18/2025 TNP  pg/mL Final    Test not performed. Insufficient specimen to perform or complete  analysis.                           Prepubertal            <40  Notified your facility 3/24/25    Prolactin 03/18/2025 38.60 (H)  4.04 - 15.20 ng/mL Final    WBC 03/18/2025 10.54  3.40 - 10.80 10*3/mm3 Final    RBC 03/18/2025 5.72  4.14 - 5.80 10*6/mm3 Final    Hemoglobin 03/18/2025 16.9  13.0 - 17.7 g/dL Final    Hematocrit 03/18/2025 53.0 (H)  37.5 - 51.0 % Final     MCV 03/18/2025 92.7  79.0 - 97.0 fL Final    MCH 03/18/2025 29.5  26.6 - 33.0 pg Final    MCHC 03/18/2025 31.9  31.5 - 35.7 g/dL Final    RDW 03/18/2025 13.2  12.3 - 15.4 % Final    RDW-SD 03/18/2025 44.9  37.0 - 54.0 fl Final    MPV 03/18/2025 9.4  6.0 - 12.0 fL Final    Platelets 03/18/2025 343  140 - 450 10*3/mm3 Final    Neutrophil % 03/18/2025 61.7  42.7 - 76.0 % Final    Lymphocyte % 03/18/2025 23.8  19.6 - 45.3 % Final    Monocyte % 03/18/2025 8.0  5.0 - 12.0 % Final    Eosinophil % 03/18/2025 5.5  0.3 - 6.2 % Final    Basophil % 03/18/2025 0.7  0.0 - 1.5 % Final    Immature Grans % 03/18/2025 0.3  0.0 - 0.5 % Final    Neutrophils, Absolute 03/18/2025 6.51  1.70 - 7.00 10*3/mm3 Final    Lymphocytes, Absolute 03/18/2025 2.51  0.70 - 3.10 10*3/mm3 Final    Monocytes, Absolute 03/18/2025 0.84  0.10 - 0.90 10*3/mm3 Final    Eosinophils, Absolute 03/18/2025 0.58 (H)  0.00 - 0.40 10*3/mm3 Final    Basophils, Absolute 03/18/2025 0.07  0.00 - 0.20 10*3/mm3 Final    Immature Grans, Absolute 03/18/2025 0.03  0.00 - 0.05 10*3/mm3 Final    nRBC 03/18/2025 0.0  0.0 - 0.2 /100 WBC Final    Bilirubin, Direct 03/18/2025 0.1  0.0 - 0.3 mg/dL Final       Assessment and Plan  Diagnoses and all orders for this visit:    1. Elevated PSA (Primary)    2. Paranoid schizophrenia    3. Essential hypertension  -     lisinopril (PRINIVIL,ZESTRIL) 20 MG tablet; Take 1 tablet by mouth Daily.  Dispense: 90 tablet; Refill: 1    4. Benign prostatic hyperplasia with urinary frequency  -     tamsulosin (FLOMAX) 0.4 MG capsule 24 hr capsule; Take 1 capsule by mouth Daily.  Dispense: 90 capsule; Refill: 2    5. Resting tremor      Assessment & Plan  1. Prostatitis.  His PSA levels are within the upper limit of normal, suggesting benign prostatic hyperplasia rather than prostate cancer. The likelihood of prostate cancer is low. He has been on intermittent testosterone therapy for several years, indicating a probable inability to produce  endogenous testosterone. He is advised to continue his testosterone therapy under the supervision of his healthcare provider in [place]. A prescription for Flomax 0.4 mg daily will be provided to alleviate his urinary symptoms. A referral to Dr. Thakkar, a urologist, will be made for further evaluation and potential biopsy. His PSA levels will be monitored annually.    2. Schizophrenia.  His schizophrenia appears to be well-managed. He reports feeling paranoid and anxious about leaving the facility. He is advised to continue following up with [facility] for his psychiatric care.    3. Hypertension.  His blood pressure readings have been consistently high, with diastolic values exceeding 100. He will be restarted on lisinopril 20 mg daily. A 90-day prescription for lisinopril will be sent to his pharmacy.    4. Tremors.  He has been experiencing tremors for the past few months, which could be indicative of early-stage EPS or essential tremors. He is advised to discuss this symptom with his psychiatrist during his next visit.    Follow-up  The patient will follow up in 6 months.       Class 3 Severe Obesity (BMI >=40). Obesity-related health conditions include the following: hypertension. Obesity is newly identified. BMI is is above average; BMI management plan is completed. We discussed portion control and increasing exercise.       New Medications:   New Medications Ordered This Visit   Medications    lisinopril (PRINIVIL,ZESTRIL) 20 MG tablet     Sig: Take 1 tablet by mouth Daily.     Dispense:  90 tablet     Refill:  1    tamsulosin (FLOMAX) 0.4 MG capsule 24 hr capsule     Sig: Take 1 capsule by mouth Daily.     Dispense:  90 capsule     Refill:  2       Discontinued Medications:   Medications Discontinued During This Encounter   Medication Reason    lisinopril (PRINIVIL,ZESTRIL) 20 MG tablet     ARIPiprazole (ABILIFY) 10 MG tablet     benzonatate (TESSALON) 100 MG capsule *Therapy completed    ziprasidone  (GEODON) 60 MG capsule *Therapy completed                 Follow Up:   Return in about 6 months (around 9/25/2025).    Patient was given instructions and counseling regarding his condition or for health maintenance advice. Please see specific information pulled into the AVS if appropriate.     Patient or patient representative verbalized consent for the use of Ambient Listening during the visit with  Navi Herrera DO for chart documentation. 3/25/2025  13:17 EDT       This document has been electronically signed by Navi Herrera DO   March 25, 2025 13:19 EDT      Dictated Utilizing Dragon Dictation: Part of this note may be an electronic transcription/translation of spoken language to printed text using the Dragon Dictation System.

## 2025-03-26 ENCOUNTER — PATIENT ROUNDING (BHMG ONLY) (OUTPATIENT)
Dept: PSYCHIATRY | Facility: CLINIC | Age: 60
End: 2025-03-26
Payer: MEDICARE

## 2025-03-26 NOTE — PROGRESS NOTES
March 26, 2025    Hello, may I speak with Jose Taylor?    My name is   Mignon    I am  with [facility]  [address] [place] [address] [phone].    Before we get started may I verify your date of birth? 1965 yes     I am calling to officially welcome you to our practice and ask about your recent visit. Is this a good time to talk? Yes     Tell me about your visit with us. What things went well?  the visit went well ,was pleased with        We're always looking for ways to make our patients' experiences even better. Do you have recommendations on ways we may improve?  no     Overall were you satisfied with your first visit to our practice? Yes        I appreciate you taking the time to speak with me today. Is there anything else I can do for you?   No     Thank you, and have a great day.

## 2025-03-29 LAB
25(OH)D2 SERPL-MCNC: <1 NG/ML
25(OH)D3 SERPL-MCNC: 45 NG/ML
25(OH)D3+25(OH)D2 SERPL-MCNC: 45 NG/ML

## 2025-04-04 ENCOUNTER — TELEPHONE (OUTPATIENT)
Dept: FAMILY MEDICINE CLINIC | Facility: CLINIC | Age: 60
End: 2025-04-04
Payer: MEDICARE

## 2025-05-28 DIAGNOSIS — I10 ESSENTIAL HYPERTENSION: ICD-10-CM

## 2025-05-28 NOTE — TELEPHONE ENCOUNTER
Caller: [facility], TERRY ROLLINS    Relationship: Other    Best call back number: [phone]     Requested Prescriptions:   Requested Prescriptions     Pending Prescriptions Disp Refills    metoprolol succinate XL (TOPROL-XL) 25 MG 24 hr tablet 30 tablet 2     Sig: Take 1 tablet by mouth Daily.        Pharmacy where request should be sent: [facility] - [place], [place] - [address] - [phone]  - [phone] FX     Last office visit with prescribing clinician: 3/25/2025   Last telemedicine visit with prescribing clinician: Visit date not found   Next office visit with prescribing clinician: 9/25/2025     Additional details provided by patient: PATIENT NEEDS SCRIPT FROM DR. SIM FOR THIS MEDICATION.     Does the patient have less than a 3 day supply:  [] Yes  [x] No    Would you like a call back once the refill request has been completed: [] Yes [x] No    If the office needs to give you a call back, can they leave a voicemail: [] Yes [x] No    Cinthia Goldberg Rep   05/28/25 15:34 EDT

## 2025-05-29 RX ORDER — METOPROLOL SUCCINATE 25 MG/1
25 TABLET, EXTENDED RELEASE ORAL DAILY
Qty: 30 TABLET | Refills: 2 | Status: SHIPPED | OUTPATIENT
Start: 2025-05-29

## 2025-06-02 ENCOUNTER — TELEPHONE (OUTPATIENT)
Dept: FAMILY MEDICINE CLINIC | Facility: CLINIC | Age: 60
End: 2025-06-02
Payer: MEDICARE

## 2025-06-02 NOTE — TELEPHONE ENCOUNTER
Rayo from [facility] called regarding refill of Metoprolol 25 mg,he reports patient has been taking 50 mg daily from previous provider,his BP today 105/73 HR-101 which dose should he be taking?

## 2025-06-03 DIAGNOSIS — I10 ESSENTIAL HYPERTENSION: ICD-10-CM

## 2025-06-03 RX ORDER — METOPROLOL SUCCINATE 50 MG/1
50 TABLET, EXTENDED RELEASE ORAL DAILY
Qty: 90 TABLET | Refills: 1 | Status: SHIPPED | OUTPATIENT
Start: 2025-06-03

## 2025-06-25 DIAGNOSIS — E78.2 MIXED HYPERLIPIDEMIA: ICD-10-CM

## 2025-06-25 RX ORDER — ATORVASTATIN CALCIUM 40 MG/1
40 TABLET, FILM COATED ORAL DAILY
Qty: 90 TABLET | Refills: 0 | OUTPATIENT
Start: 2025-06-25

## 2025-06-25 RX ORDER — ATORVASTATIN CALCIUM 40 MG/1
40 TABLET, FILM COATED ORAL NIGHTLY
Qty: 90 TABLET | Refills: 1 | Status: SHIPPED | OUTPATIENT
Start: 2025-06-25

## 2025-06-25 NOTE — TELEPHONE ENCOUNTER
Caller: JOSEF TYLER    Relationship:     Best call back number: [phone]     Requested Prescriptions:   Requested Prescriptions     Pending Prescriptions Disp Refills    atorvastatin (LIPITOR) 40 MG tablet 90 tablet 0     Sig: Take 1 tablet by mouth Daily. FOR CHOLESTEROL        Pharmacy where request should be sent: [facility] - [place], [place] - [address] - [phone]  - [phone] FX     Last office visit with prescribing clinician: 3/25/2025   Last telemedicine visit with prescribing clinician: Visit date not found   Next office visit with prescribing clinician: 9/25/2025     Additional details provided by patient: PATIENT ONLY HAS A THREE DAY SUPPLY    Does the patient have less than a 3 day supply:  [] Yes  [x] No    Would you like a call back once the refill request has been completed: [] Yes [x] No    If the office needs to give you a call back, can they leave a voicemail: [] Yes [x] No    Cinthia Goldberg Rep   06/25/25 13:37 EDT